# Patient Record
Sex: MALE | Race: WHITE | Employment: FULL TIME | URBAN - METROPOLITAN AREA
[De-identification: names, ages, dates, MRNs, and addresses within clinical notes are randomized per-mention and may not be internally consistent; named-entity substitution may affect disease eponyms.]

---

## 2020-02-12 ENCOUNTER — ED HISTORICAL/CONVERTED ENCOUNTER (OUTPATIENT)
Dept: OTHER | Age: 55
End: 2020-02-12

## 2020-11-24 ENCOUNTER — HOSPITAL ENCOUNTER (EMERGENCY)
Age: 55
Discharge: HOME | End: 2020-11-24
Attending: EMERGENCY MEDICINE
Payer: COMMERCIAL

## 2020-11-24 VITALS
BODY MASS INDEX: 25.77 KG/M2 | HEIGHT: 70 IN | WEIGHT: 180 LBS | RESPIRATION RATE: 16 BRPM | OXYGEN SATURATION: 98 % | TEMPERATURE: 98 F | HEART RATE: 75 BPM | DIASTOLIC BLOOD PRESSURE: 71 MMHG | SYSTOLIC BLOOD PRESSURE: 127 MMHG

## 2020-11-24 DIAGNOSIS — B35.4 TINEA CORPORIS: Primary | ICD-10-CM

## 2020-11-24 LAB
APPEARANCE UR: CLEAR
BACTERIA URNS QL MICRO: NEGATIVE /HPF
BILIRUB UR QL: NEGATIVE
COLOR UR: YELLOW
EPITH CASTS URNS QL MICRO: ABNORMAL /LPF
GLUCOSE BLD STRIP.AUTO-MCNC: 117 MG/DL (ref 65–100)
GLUCOSE UR STRIP.AUTO-MCNC: NEGATIVE MG/DL
HGB UR QL STRIP: NEGATIVE
KETONES UR QL STRIP.AUTO: NEGATIVE MG/DL
LEUKOCYTE ESTERASE UR QL STRIP.AUTO: NEGATIVE
NITRITE UR QL STRIP.AUTO: NEGATIVE
PERFORMED BY, TECHID: ABNORMAL
PH UR STRIP: 7 [PH] (ref 5–8)
PROT UR STRIP-MCNC: NEGATIVE MG/DL
RBC #/AREA URNS HPF: ABNORMAL /HPF (ref 0–5)
SP GR UR REFRACTOMETRY: 1.01 (ref 1–1.03)
UROBILINOGEN UR QL STRIP.AUTO: 0.1 EU/DL (ref 0.2–1)
WBC URNS QL MICRO: ABNORMAL /HPF (ref 0–4)

## 2020-11-24 PROCEDURE — 99283 EMERGENCY DEPT VISIT LOW MDM: CPT

## 2020-11-24 PROCEDURE — 81001 URINALYSIS AUTO W/SCOPE: CPT

## 2020-11-24 PROCEDURE — 82962 GLUCOSE BLOOD TEST: CPT

## 2020-11-24 RX ORDER — CLOTRIMAZOLE AND BETAMETHASONE DIPROPIONATE 10; .64 MG/G; MG/G
CREAM TOPICAL 2 TIMES DAILY
Qty: 1 TUBE | Refills: 0 | Status: SHIPPED | OUTPATIENT
Start: 2020-11-24 | End: 2022-04-28

## 2020-11-24 RX ORDER — FLUCONAZOLE 100 MG/1
150 TABLET ORAL DAILY
Qty: 7 TAB | Refills: 0 | Status: SHIPPED | OUTPATIENT
Start: 2020-11-24 | End: 2020-12-01

## 2020-11-25 NOTE — ED PROVIDER NOTES
EMERGENCY DEPARTMENT HISTORY AND PHYSICAL EXAM      Date: 11/24/2020  Patient Name: Phoenix Cocnepcion. History of Presenting Illness     Chief Complaint   Patient presents with    Rash       History Provided By: Patient    HPI: Phoenix Concepcion., 54 y.o. male with a past medical history significant No significant past medical history presents to the ED with cc of Groin rash 1 week. Used Lotrimin without relief    There are no other complaints, changes, or physical findings at this time. PCP: None    No current facility-administered medications on file prior to encounter. No current outpatient medications on file prior to encounter. Past History     Past Medical History:  No past medical history on file. Past Surgical History:  No past surgical history on file. Family History:  No family history on file. Social History:  Social History     Tobacco Use    Smoking status: Not on file   Substance Use Topics    Alcohol use: Not on file    Drug use: Not on file       Allergies:  No Known Allergies      Review of Systems     Review of Systems   Constitutional: Negative. HENT: Negative. Respiratory: Negative. Cardiovascular: Negative. Gastrointestinal: Negative. Genitourinary: Negative. Musculoskeletal: Negative. Skin: Positive for rash. Neurological: Negative. All other systems reviewed and are negative. Physical Exam     Physical Exam  Vitals signs and nursing note reviewed. Constitutional:       Appearance: Normal appearance. HENT:      Head: Normocephalic and atraumatic. Eyes:      Pupils: Pupils are equal, round, and reactive to light. Neck:      Musculoskeletal: Normal range of motion and neck supple. Cardiovascular:      Rate and Rhythm: Normal rate and regular rhythm. Pulses: Normal pulses. Heart sounds: Normal heart sounds. Pulmonary:      Effort: Pulmonary effort is normal.      Breath sounds: Normal breath sounds.    Musculoskeletal: Normal range of motion. Skin:     Comments: Erythematous tineaform rash in groin   Neurological:      General: No focal deficit present. Mental Status: He is alert and oriented to person, place, and time. Lab and Diagnostic Study Results     Labs -     Recent Results (from the past 12 hour(s))   URINALYSIS W/MICROSCOPIC    Collection Time: 11/24/20  6:15 PM   Result Value Ref Range    Color Yellow      Appearance Clear Clear      Specific gravity 1.010 1.003 - 1.030      pH (UA) 7.0 5.0 - 8.0      Protein Negative Negative mg/dL    Glucose Negative Negative mg/dL    Ketone Negative Negative mg/dL    Bilirubin Negative Negative      Blood Negative Negative      Urobilinogen 0.1 (L) 0.2 - 1.0 EU/dL    Nitrites Negative Negative      Leukocyte Esterase Negative Negative      WBC 0-4 0 - 4 /hpf    RBC 0-5 0 - 5 /hpf    Epithelial cells Few Few /lpf    Bacteria Negative Negative /hpf       Radiologic Studies -   @lastxrresult@  CT Results  (Last 48 hours)    None        CXR Results  (Last 48 hours)    None            Medical Decision Making   - I am the first provider for this patient. - I reviewed the vital signs, available nursing notes, past medical history, past surgical history, family history and social history. - Initial assessment performed. The patients presenting problems have been discussed, and they are in agreement with the care plan formulated and outlined with them. I have encouraged them to ask questions as they arise throughout their visit. Vital Signs-Reviewed the patient's vital signs. Patient Vitals for the past 12 hrs:   Temp Pulse Resp BP SpO2   11/24/20 1814 98 °F (36.7 °C) 75 16 127/71 98 %       Records Reviewed: Nursing Notes    The patient presents with       ED Course:          Provider Notes (Medical Decision Making):      MDM       Procedures   Medical Decision Makingedical Decision Making  Performed by: George Frank MD  PROCEDURES:  Procedures       Disposition Disposition: DC- Adult Discharges: All of the diagnostic tests were reviewed and questions answered. Diagnosis, care plan and treatment options were discussed. The patient understands the instructions and will follow up as directed. The patients results have been reviewed with them. They have been counseled regarding their diagnosis. The patient verbally convey understanding and agreement of the signs, symptoms, diagnosis, treatment and prognosis and additionally agrees to follow up as recommended with their PCP in 24 - 48 hours. They also agree with the care-plan and convey that all of their questions have been answered. I have also put together some discharge instructions for them that include: 1) educational information regarding their diagnosis, 2) how to care for their diagnosis at home, as well a 3) list of reasons why they would want to return to the ED prior to their follow-up appointment, should their condition change. Discharged    DISCHARGE PLAN:  1. There are no discharge medications for this patient. 2.   Follow-up Information     Follow up With Specialties Details Why Contact Info    Anya Schofield MD Internal Medicine In 1 week  [address] [facility]  [phone]          3. Return to ED if worse   4. Current Discharge Medication List      START taking these medications    Details   clotrimazole-betamethasone (Lotrisone) topical cream Apply  to affected area two (2) times a day. Apply to affected area  Qty: 1 Tube, Refills: 0      fluconazole (Diflucan) 100 mg tablet Take 1.5 Tabs by mouth daily for 7 days. FDA advises cautious prescribing of oral fluconazole in pregnancy. Qty: 7 Tab, Refills: 0               Diagnosis     Clinical Impression:   1. Tinea corporis        Attestations:    Divina Macdonald MD    Please note that this dictation was completed with [email or web address], the i3 membrane voice recognition software.   Quite often unanticipated grammatical, syntax, homophones, and other interpretive errors are inadvertently transcribed by the computer software. Please disregard these errors. Please excuse any errors that have escaped final proofreading. Thank you.

## 2022-04-28 ENCOUNTER — ANESTHESIA (OUTPATIENT)
Dept: SURGERY | Age: 57
DRG: 654 | End: 2022-04-28
Payer: COMMERCIAL

## 2022-04-28 ENCOUNTER — APPOINTMENT (OUTPATIENT)
Dept: GENERAL RADIOLOGY | Age: 57
DRG: 654 | End: 2022-04-28
Attending: UROLOGY
Payer: COMMERCIAL

## 2022-04-28 ENCOUNTER — ANESTHESIA EVENT (OUTPATIENT)
Dept: SURGERY | Age: 57
DRG: 654 | End: 2022-04-28
Payer: COMMERCIAL

## 2022-04-28 ENCOUNTER — APPOINTMENT (OUTPATIENT)
Dept: CT IMAGING | Age: 57
DRG: 654 | End: 2022-04-28
Attending: EMERGENCY MEDICINE
Payer: COMMERCIAL

## 2022-04-28 ENCOUNTER — HOSPITAL ENCOUNTER (INPATIENT)
Age: 57
LOS: 1 days | Discharge: HOME | DRG: 654 | End: 2022-04-29
Attending: EMERGENCY MEDICINE | Admitting: FAMILY MEDICINE
Payer: COMMERCIAL

## 2022-04-28 DIAGNOSIS — N20.0 KIDNEY STONE: Primary | ICD-10-CM

## 2022-04-28 LAB
ALBUMIN SERPL-MCNC: 3.6 G/DL (ref 3.5–5)
ALBUMIN/GLOB SERPL: 1.3 {RATIO} (ref 1.1–2.2)
ALP SERPL-CCNC: 92 U/L (ref 45–117)
ALT SERPL-CCNC: 24 U/L (ref 12–78)
ANION GAP SERPL CALC-SCNC: 5 MMOL/L (ref 5–15)
APPEARANCE UR: ABNORMAL
AST SERPL W P-5'-P-CCNC: 15 U/L (ref 15–37)
BACTERIA URNS QL MICRO: NEGATIVE /HPF
BASOPHILS # BLD: 0 K/UL (ref 0–0.1)
BASOPHILS NFR BLD: 0 % (ref 0–1)
BILIRUB SERPL-MCNC: 0.6 MG/DL (ref 0.2–1)
BILIRUB UR QL: NEGATIVE
BUN SERPL-MCNC: 13 MG/DL (ref 6–20)
BUN/CREAT SERPL: 11 (ref 12–20)
CA-I BLD-MCNC: 8.8 MG/DL (ref 8.5–10.1)
CHLORIDE SERPL-SCNC: 109 MMOL/L (ref 97–108)
CO2 SERPL-SCNC: 27 MMOL/L (ref 21–32)
COLOR UR: ABNORMAL
CREAT SERPL-MCNC: 1.18 MG/DL (ref 0.7–1.3)
DIFFERENTIAL METHOD BLD: NORMAL
EOSINOPHIL # BLD: 0.1 K/UL (ref 0–0.4)
EOSINOPHIL NFR BLD: 2 % (ref 0–7)
ERYTHROCYTE [DISTWIDTH] IN BLOOD BY AUTOMATED COUNT: 12.4 % (ref 11.5–14.5)
GLOBULIN SER CALC-MCNC: 2.7 G/DL (ref 2–4)
GLUCOSE SERPL-MCNC: 118 MG/DL (ref 65–100)
GLUCOSE UR STRIP.AUTO-MCNC: NEGATIVE MG/DL
HCT VFR BLD AUTO: 45.8 % (ref 36.6–50.3)
HGB BLD-MCNC: 15.3 G/DL (ref 12.1–17)
HGB UR QL STRIP: ABNORMAL
HYALINE CASTS URNS QL MICRO: ABNORMAL /LPF (ref 0–5)
IMM GRANULOCYTES # BLD AUTO: 0 K/UL (ref 0–0.04)
IMM GRANULOCYTES NFR BLD AUTO: 0 % (ref 0–0.5)
KETONES UR QL STRIP.AUTO: 5 MG/DL
LEUKOCYTE ESTERASE UR QL STRIP.AUTO: ABNORMAL
LYMPHOCYTES # BLD: 1.8 K/UL (ref 0.8–3.5)
LYMPHOCYTES NFR BLD: 25 % (ref 12–49)
MCH RBC QN AUTO: 31.7 PG (ref 26–34)
MCHC RBC AUTO-ENTMCNC: 33.4 G/DL (ref 30–36.5)
MCV RBC AUTO: 94.8 FL (ref 80–99)
MONOCYTES # BLD: 0.8 K/UL (ref 0–1)
MONOCYTES NFR BLD: 11 % (ref 5–13)
MUCOUS THREADS URNS QL MICRO: ABNORMAL /LPF
NEUTS SEG # BLD: 4.6 K/UL (ref 1.8–8)
NEUTS SEG NFR BLD: 62 % (ref 32–75)
NITRITE UR QL STRIP.AUTO: NEGATIVE
NRBC # BLD: 0 K/UL (ref 0–0.01)
NRBC BLD-RTO: 0 PER 100 WBC
PH UR STRIP: 5 [PH] (ref 5–8)
PLATELET # BLD AUTO: 205 K/UL (ref 150–400)
PMV BLD AUTO: 11.1 FL (ref 8.9–12.9)
POTASSIUM SERPL-SCNC: 3.9 MMOL/L (ref 3.5–5.1)
PROT SERPL-MCNC: 6.3 G/DL (ref 6.4–8.2)
PROT UR STRIP-MCNC: NEGATIVE MG/DL
RBC # BLD AUTO: 4.83 M/UL (ref 4.1–5.7)
RBC #/AREA URNS HPF: >100 /HPF (ref 0–5)
SODIUM SERPL-SCNC: 141 MMOL/L (ref 136–145)
SP GR UR REFRACTOMETRY: 1.02 (ref 1–1.03)
UA: UC IF INDICATED,UAUC: ABNORMAL
UROBILINOGEN UR QL STRIP.AUTO: 0.1 EU/DL (ref 0.1–1)
WBC # BLD AUTO: 7.4 K/UL (ref 4.1–11.1)
WBC URNS QL MICRO: ABNORMAL /HPF (ref 0–4)

## 2022-04-28 PROCEDURE — 77030020268 HC MISC GENERAL SUPPLY: Performed by: UROLOGY

## 2022-04-28 PROCEDURE — 65270000029 HC RM PRIVATE

## 2022-04-28 PROCEDURE — 96361 HYDRATE IV INFUSION ADD-ON: CPT

## 2022-04-28 PROCEDURE — 85025 COMPLETE CBC W/AUTO DIFF WBC: CPT

## 2022-04-28 PROCEDURE — 87086 URINE CULTURE/COLONY COUNT: CPT

## 2022-04-28 PROCEDURE — 96374 THER/PROPH/DIAG INJ IV PUSH: CPT

## 2022-04-28 PROCEDURE — 96375 TX/PRO/DX INJ NEW DRUG ADDON: CPT

## 2022-04-28 PROCEDURE — 77030013079 HC BLNKT BAIR HGGR 3M -A: Performed by: ANESTHESIOLOGY

## 2022-04-28 PROCEDURE — 0TC78ZZ EXTIRPATION OF MATTER FROM LEFT URETER, VIA NATURAL OR ARTIFICIAL OPENING ENDOSCOPIC: ICD-10-PCS | Performed by: UROLOGY

## 2022-04-28 PROCEDURE — 74011250637 HC RX REV CODE- 250/637: Performed by: EMERGENCY MEDICINE

## 2022-04-28 PROCEDURE — C2617 STENT, NON-COR, TEM W/O DEL: HCPCS | Performed by: UROLOGY

## 2022-04-28 PROCEDURE — 81001 URINALYSIS AUTO W/SCOPE: CPT

## 2022-04-28 PROCEDURE — 74011250636 HC RX REV CODE- 250/636: Performed by: NURSE ANESTHETIST, CERTIFIED REGISTERED

## 2022-04-28 PROCEDURE — 76210000006 HC OR PH I REC 0.5 TO 1 HR: Performed by: UROLOGY

## 2022-04-28 PROCEDURE — 82360 CALCULUS ASSAY QUANT: CPT

## 2022-04-28 PROCEDURE — 77030010509 HC AIRWY LMA MSK TELE -A: Performed by: ANESTHESIOLOGY

## 2022-04-28 PROCEDURE — 74011250636 HC RX REV CODE- 250/636

## 2022-04-28 PROCEDURE — 76060000033 HC ANESTHESIA 1 TO 1.5 HR: Performed by: UROLOGY

## 2022-04-28 PROCEDURE — 0T7B8DZ DILATION OF BLADDER WITH INTRALUMINAL DEVICE, VIA NATURAL OR ARTIFICIAL OPENING ENDOSCOPIC: ICD-10-PCS | Performed by: UROLOGY

## 2022-04-28 PROCEDURE — 74011000250 HC RX REV CODE- 250: Performed by: NURSE ANESTHETIST, CERTIFIED REGISTERED

## 2022-04-28 PROCEDURE — 96376 TX/PRO/DX INJ SAME DRUG ADON: CPT

## 2022-04-28 PROCEDURE — C1758 CATHETER, URETERAL: HCPCS | Performed by: UROLOGY

## 2022-04-28 PROCEDURE — 74011250637 HC RX REV CODE- 250/637: Performed by: FAMILY MEDICINE

## 2022-04-28 PROCEDURE — 99221 1ST HOSP IP/OBS SF/LOW 40: CPT | Performed by: UROLOGY

## 2022-04-28 PROCEDURE — 74011250636 HC RX REV CODE- 250/636: Performed by: FAMILY MEDICINE

## 2022-04-28 PROCEDURE — 77030030430 HC EXTRCT STN COOK -C: Performed by: UROLOGY

## 2022-04-28 PROCEDURE — 74011250636 HC RX REV CODE- 250/636: Performed by: EMERGENCY MEDICINE

## 2022-04-28 PROCEDURE — 76010000161 HC OR TIME 1 TO 1.5 HR INTENSV-TIER 1: Performed by: UROLOGY

## 2022-04-28 PROCEDURE — 74011000250 HC RX REV CODE- 250: Performed by: FAMILY MEDICINE

## 2022-04-28 PROCEDURE — 80053 COMPREHEN METABOLIC PANEL: CPT

## 2022-04-28 PROCEDURE — 76000 FLUOROSCOPY <1 HR PHYS/QHP: CPT

## 2022-04-28 PROCEDURE — 74176 CT ABD & PELVIS W/O CONTRAST: CPT

## 2022-04-28 PROCEDURE — 2709999900 HC NON-CHARGEABLE SUPPLY: Performed by: UROLOGY

## 2022-04-28 PROCEDURE — 77030040361 HC SLV COMPR DVT MDII -B: Performed by: UROLOGY

## 2022-04-28 PROCEDURE — 99285 EMERGENCY DEPT VISIT HI MDM: CPT

## 2022-04-28 PROCEDURE — 52356 CYSTO/URETERO W/LITHOTRIPSY: CPT | Performed by: UROLOGY

## 2022-04-28 DEVICE — VARIABLE LENGTH URETERAL STENT
Type: IMPLANTABLE DEVICE | Status: FUNCTIONAL
Brand: CONTOUR VL™

## 2022-04-28 RX ORDER — HYDROMORPHONE HYDROCHLORIDE 1 MG/ML
0.5 INJECTION, SOLUTION INTRAMUSCULAR; INTRAVENOUS; SUBCUTANEOUS
Status: DISCONTINUED | OUTPATIENT
Start: 2022-04-28 | End: 2022-04-28 | Stop reason: HOSPADM

## 2022-04-28 RX ORDER — PROPOFOL 10 MG/ML
INJECTION, EMULSION INTRAVENOUS AS NEEDED
Status: DISCONTINUED | OUTPATIENT
Start: 2022-04-28 | End: 2022-04-28 | Stop reason: HOSPADM

## 2022-04-28 RX ORDER — TAMSULOSIN HYDROCHLORIDE 0.4 MG/1
0.4 CAPSULE ORAL DAILY
Status: DISCONTINUED | OUTPATIENT
Start: 2022-04-28 | End: 2022-04-28

## 2022-04-28 RX ORDER — TRAMADOL HYDROCHLORIDE 50 MG/1
50 TABLET ORAL
Status: DISCONTINUED | OUTPATIENT
Start: 2022-04-28 | End: 2022-04-29 | Stop reason: HOSPADM

## 2022-04-28 RX ORDER — SODIUM CHLORIDE 0.9 % (FLUSH) 0.9 %
5-40 SYRINGE (ML) INJECTION AS NEEDED
Status: DISCONTINUED | OUTPATIENT
Start: 2022-04-28 | End: 2022-04-28 | Stop reason: HOSPADM

## 2022-04-28 RX ORDER — ACETAMINOPHEN 325 MG/1
650 TABLET ORAL
Status: DISCONTINUED | OUTPATIENT
Start: 2022-04-28 | End: 2022-04-29 | Stop reason: HOSPADM

## 2022-04-28 RX ORDER — FENTANYL CITRATE 50 UG/ML
50 INJECTION, SOLUTION INTRAMUSCULAR; INTRAVENOUS AS NEEDED
Status: DISCONTINUED | OUTPATIENT
Start: 2022-04-28 | End: 2022-04-28 | Stop reason: HOSPADM

## 2022-04-28 RX ORDER — HYDROMORPHONE HYDROCHLORIDE 2 MG/ML
INJECTION, SOLUTION INTRAMUSCULAR; INTRAVENOUS; SUBCUTANEOUS
Status: COMPLETED
Start: 2022-04-28 | End: 2022-04-28

## 2022-04-28 RX ORDER — HYDROMORPHONE HYDROCHLORIDE 1 MG/ML
1 INJECTION, SOLUTION INTRAMUSCULAR; INTRAVENOUS; SUBCUTANEOUS ONCE
Status: COMPLETED | OUTPATIENT
Start: 2022-04-28 | End: 2022-04-28

## 2022-04-28 RX ORDER — SODIUM CHLORIDE 9 MG/ML
125 INJECTION, SOLUTION INTRAVENOUS CONTINUOUS
Status: DISCONTINUED | OUTPATIENT
Start: 2022-04-28 | End: 2022-04-29 | Stop reason: HOSPADM

## 2022-04-28 RX ORDER — SODIUM CHLORIDE 0.9 % (FLUSH) 0.9 %
5-40 SYRINGE (ML) INJECTION EVERY 8 HOURS
Status: DISCONTINUED | OUTPATIENT
Start: 2022-04-28 | End: 2022-04-28 | Stop reason: HOSPADM

## 2022-04-28 RX ORDER — DIPHENHYDRAMINE HYDROCHLORIDE 50 MG/ML
12.5 INJECTION, SOLUTION INTRAMUSCULAR; INTRAVENOUS AS NEEDED
Status: DISCONTINUED | OUTPATIENT
Start: 2022-04-28 | End: 2022-04-28 | Stop reason: HOSPADM

## 2022-04-28 RX ORDER — POLYETHYLENE GLYCOL 3350 17 G/17G
17 POWDER, FOR SOLUTION ORAL DAILY PRN
Status: DISCONTINUED | OUTPATIENT
Start: 2022-04-28 | End: 2022-04-29 | Stop reason: HOSPADM

## 2022-04-28 RX ORDER — ONDANSETRON 4 MG/1
4 TABLET, ORALLY DISINTEGRATING ORAL
Status: DISCONTINUED | OUTPATIENT
Start: 2022-04-28 | End: 2022-04-29 | Stop reason: HOSPADM

## 2022-04-28 RX ORDER — DEXAMETHASONE SODIUM PHOSPHATE 4 MG/ML
INJECTION, SOLUTION INTRA-ARTICULAR; INTRALESIONAL; INTRAMUSCULAR; INTRAVENOUS; SOFT TISSUE AS NEEDED
Status: DISCONTINUED | OUTPATIENT
Start: 2022-04-28 | End: 2022-04-28 | Stop reason: HOSPADM

## 2022-04-28 RX ORDER — PHENAZOPYRIDINE HYDROCHLORIDE 95 MG/1
95 TABLET ORAL
Status: DISCONTINUED | OUTPATIENT
Start: 2022-04-28 | End: 2022-04-29 | Stop reason: HOSPADM

## 2022-04-28 RX ORDER — MIDAZOLAM HYDROCHLORIDE 1 MG/ML
1 INJECTION, SOLUTION INTRAMUSCULAR; INTRAVENOUS AS NEEDED
Status: DISCONTINUED | OUTPATIENT
Start: 2022-04-28 | End: 2022-04-28 | Stop reason: HOSPADM

## 2022-04-28 RX ORDER — HYDROMORPHONE HYDROCHLORIDE 1 MG/ML
2 INJECTION, SOLUTION INTRAMUSCULAR; INTRAVENOUS; SUBCUTANEOUS ONCE
Status: ACTIVE | OUTPATIENT
Start: 2022-04-28 | End: 2022-04-29

## 2022-04-28 RX ORDER — ONDANSETRON 2 MG/ML
4 INJECTION INTRAMUSCULAR; INTRAVENOUS
Status: DISCONTINUED | OUTPATIENT
Start: 2022-04-28 | End: 2022-04-29 | Stop reason: HOSPADM

## 2022-04-28 RX ORDER — LIDOCAINE HYDROCHLORIDE 10 MG/ML
0.1 INJECTION, SOLUTION EPIDURAL; INFILTRATION; INTRACAUDAL; PERINEURAL AS NEEDED
Status: DISCONTINUED | OUTPATIENT
Start: 2022-04-28 | End: 2022-04-28 | Stop reason: HOSPADM

## 2022-04-28 RX ORDER — ATROPA BELLADONNA AND OPIUM 16.2; 6 MG/1; MG/1
SUPPOSITORY RECTAL
Status: DISPENSED
Start: 2022-04-28 | End: 2022-04-29

## 2022-04-28 RX ORDER — ALBUTEROL SULFATE 0.83 MG/ML
2.5 SOLUTION RESPIRATORY (INHALATION) AS NEEDED
Status: DISCONTINUED | OUTPATIENT
Start: 2022-04-28 | End: 2022-04-28 | Stop reason: HOSPADM

## 2022-04-28 RX ORDER — FENTANYL CITRATE 50 UG/ML
INJECTION, SOLUTION INTRAMUSCULAR; INTRAVENOUS AS NEEDED
Status: DISCONTINUED | OUTPATIENT
Start: 2022-04-28 | End: 2022-04-28 | Stop reason: HOSPADM

## 2022-04-28 RX ORDER — ACETAMINOPHEN 650 MG/1
650 SUPPOSITORY RECTAL
Status: DISCONTINUED | OUTPATIENT
Start: 2022-04-28 | End: 2022-04-29 | Stop reason: HOSPADM

## 2022-04-28 RX ORDER — ONDANSETRON 2 MG/ML
4 INJECTION INTRAMUSCULAR; INTRAVENOUS ONCE
Status: COMPLETED | OUTPATIENT
Start: 2022-04-28 | End: 2022-04-28

## 2022-04-28 RX ORDER — MIDAZOLAM HYDROCHLORIDE 1 MG/ML
0.5 INJECTION, SOLUTION INTRAMUSCULAR; INTRAVENOUS
Status: DISCONTINUED | OUTPATIENT
Start: 2022-04-28 | End: 2022-04-28 | Stop reason: HOSPADM

## 2022-04-28 RX ORDER — ENOXAPARIN SODIUM 100 MG/ML
40 INJECTION SUBCUTANEOUS DAILY
Status: DISCONTINUED | OUTPATIENT
Start: 2022-04-28 | End: 2022-04-29 | Stop reason: HOSPADM

## 2022-04-28 RX ORDER — ONDANSETRON 2 MG/ML
INJECTION INTRAMUSCULAR; INTRAVENOUS AS NEEDED
Status: DISCONTINUED | OUTPATIENT
Start: 2022-04-28 | End: 2022-04-28 | Stop reason: HOSPADM

## 2022-04-28 RX ORDER — TAMSULOSIN HYDROCHLORIDE 0.4 MG/1
0.4 CAPSULE ORAL
Status: COMPLETED | OUTPATIENT
Start: 2022-04-28 | End: 2022-04-28

## 2022-04-28 RX ORDER — KETOROLAC TROMETHAMINE 30 MG/ML
30 INJECTION, SOLUTION INTRAMUSCULAR; INTRAVENOUS ONCE
Status: COMPLETED | OUTPATIENT
Start: 2022-04-28 | End: 2022-04-28

## 2022-04-28 RX ORDER — MIDAZOLAM HYDROCHLORIDE 1 MG/ML
INJECTION, SOLUTION INTRAMUSCULAR; INTRAVENOUS AS NEEDED
Status: DISCONTINUED | OUTPATIENT
Start: 2022-04-28 | End: 2022-04-28 | Stop reason: HOSPADM

## 2022-04-28 RX ORDER — LIDOCAINE HYDROCHLORIDE 20 MG/ML
INJECTION, SOLUTION EPIDURAL; INFILTRATION; INTRACAUDAL; PERINEURAL AS NEEDED
Status: DISCONTINUED | OUTPATIENT
Start: 2022-04-28 | End: 2022-04-28 | Stop reason: HOSPADM

## 2022-04-28 RX ORDER — HYDROCODONE BITARTRATE AND ACETAMINOPHEN 5; 325 MG/1; MG/1
1 TABLET ORAL AS NEEDED
Status: DISCONTINUED | OUTPATIENT
Start: 2022-04-28 | End: 2022-04-28 | Stop reason: HOSPADM

## 2022-04-28 RX ORDER — EPHEDRINE SULFATE/0.9% NACL/PF 50 MG/5 ML
5 SYRINGE (ML) INTRAVENOUS AS NEEDED
Status: DISCONTINUED | OUTPATIENT
Start: 2022-04-28 | End: 2022-04-28 | Stop reason: HOSPADM

## 2022-04-28 RX ORDER — FENTANYL CITRATE 50 UG/ML
50 INJECTION, SOLUTION INTRAMUSCULAR; INTRAVENOUS
Status: DISCONTINUED | OUTPATIENT
Start: 2022-04-28 | End: 2022-04-28 | Stop reason: HOSPADM

## 2022-04-28 RX ORDER — ONDANSETRON 2 MG/ML
4 INJECTION INTRAMUSCULAR; INTRAVENOUS AS NEEDED
Status: DISCONTINUED | OUTPATIENT
Start: 2022-04-28 | End: 2022-04-28 | Stop reason: HOSPADM

## 2022-04-28 RX ADMIN — FENTANYL CITRATE 50 MCG: 50 INJECTION, SOLUTION INTRAMUSCULAR; INTRAVENOUS at 22:04

## 2022-04-28 RX ADMIN — MIDAZOLAM HYDROCHLORIDE 2 MG: 2 INJECTION, SOLUTION INTRAMUSCULAR; INTRAVENOUS at 21:25

## 2022-04-28 RX ADMIN — HYDROMORPHONE HYDROCHLORIDE 1 MG: 1 INJECTION, SOLUTION INTRAMUSCULAR; INTRAVENOUS; SUBCUTANEOUS at 01:28

## 2022-04-28 RX ADMIN — FENTANYL CITRATE 50 MCG: 50 INJECTION, SOLUTION INTRAMUSCULAR; INTRAVENOUS at 21:56

## 2022-04-28 RX ADMIN — PROPOFOL 200 MG: 10 INJECTION, EMULSION INTRAVENOUS at 21:29

## 2022-04-28 RX ADMIN — SODIUM CHLORIDE 1000 ML: 9 INJECTION, SOLUTION INTRAVENOUS at 03:36

## 2022-04-28 RX ADMIN — SODIUM CHLORIDE 1000 ML: 9 INJECTION, SOLUTION INTRAVENOUS at 01:25

## 2022-04-28 RX ADMIN — ONDANSETRON 4 MG: 2 INJECTION INTRAMUSCULAR; INTRAVENOUS at 01:26

## 2022-04-28 RX ADMIN — TAMSULOSIN HYDROCHLORIDE 0.4 MG: 0.4 CAPSULE ORAL at 03:39

## 2022-04-28 RX ADMIN — SODIUM CHLORIDE: 9 INJECTION, SOLUTION INTRAVENOUS at 21:35

## 2022-04-28 RX ADMIN — HYDROMORPHONE HYDROCHLORIDE 2 MG: 2 INJECTION INTRAMUSCULAR; INTRAVENOUS; SUBCUTANEOUS at 19:53

## 2022-04-28 RX ADMIN — TRAMADOL HYDROCHLORIDE 50 MG: 50 TABLET, COATED ORAL at 19:19

## 2022-04-28 RX ADMIN — HYDROMORPHONE HYDROCHLORIDE 1 MG: 1 INJECTION, SOLUTION INTRAMUSCULAR; INTRAVENOUS; SUBCUTANEOUS at 04:17

## 2022-04-28 RX ADMIN — ONDANSETRON 4 MG: 2 INJECTION INTRAMUSCULAR; INTRAVENOUS at 19:19

## 2022-04-28 RX ADMIN — TRAMADOL HYDROCHLORIDE 50 MG: 50 TABLET, COATED ORAL at 12:14

## 2022-04-28 RX ADMIN — ONDANSETRON 4 MG: 2 INJECTION INTRAMUSCULAR; INTRAVENOUS at 09:49

## 2022-04-28 RX ADMIN — ENOXAPARIN SODIUM 40 MG: 40 INJECTION SUBCUTANEOUS at 09:51

## 2022-04-28 RX ADMIN — DEXAMETHASONE SODIUM PHOSPHATE 4 MG: 4 INJECTION, SOLUTION INTRA-ARTICULAR; INTRALESIONAL; INTRAMUSCULAR; INTRAVENOUS; SOFT TISSUE at 21:25

## 2022-04-28 RX ADMIN — ONDANSETRON 4 MG: 2 INJECTION INTRAMUSCULAR; INTRAVENOUS at 21:25

## 2022-04-28 RX ADMIN — HYDROMORPHONE HYDROCHLORIDE 1 MG: 1 INJECTION, SOLUTION INTRAMUSCULAR; INTRAVENOUS; SUBCUTANEOUS at 06:34

## 2022-04-28 RX ADMIN — SODIUM CHLORIDE 125 ML/HR: 9 INJECTION, SOLUTION INTRAVENOUS at 09:49

## 2022-04-28 RX ADMIN — KETOROLAC TROMETHAMINE 30 MG: 30 INJECTION, SOLUTION INTRAMUSCULAR at 01:27

## 2022-04-28 RX ADMIN — SODIUM CHLORIDE 125 ML/HR: 9 INJECTION, SOLUTION INTRAVENOUS at 18:33

## 2022-04-28 RX ADMIN — LIDOCAINE HYDROCHLORIDE 100 MG: 20 INJECTION, SOLUTION EPIDURAL; INFILTRATION; INTRACAUDAL; PERINEURAL at 21:29

## 2022-04-28 RX ADMIN — CEFTRIAXONE SODIUM 1 G: 1 INJECTION, POWDER, FOR SOLUTION INTRAMUSCULAR; INTRAVENOUS at 09:50

## 2022-04-28 NOTE — H&P
HISTORY & PHYSICAL      Patient: [address] [place] [address],[address] MRN: [id number]  SSN: [id number]    YOB: 1965  Age: 64 y.o. Sex: male      Primary Care Provider: None  Source of Information: patient      Subjective     CC:   Chief Complaint   Patient presents with    Flank Pain       HPI: [facility],[address]. is a(n) 64 y.o. male with PMH significant for kidney stones who present to ED with left flank pain that radiates to left groin. Patient reports having one kidney stone in past few years ago. Pain is similar to stone in past. C/o some nausea. Denies vomiting or trauma. CT abd pelv wo contrast - 6.5 mm obstructing stone left UPJ. Left perinephric inflammation. Correlate  for medical renal disease to include pyelonephritis. Unremarkable labs    UA positive    Today patient awake and alert. Wife at bedside  Urology saw patient and discussed possible surgery this evening since patient had lunch today. Past Medical History:   Diagnosis Date    Kidney calculi         No past surgical history on file. Prior to Admission medications    Not on File       No Known Allergies     No family history on file. Social History     Socioeconomic History    Marital status:    Tobacco Use    Smoking status: Never Smoker    Smokeless tobacco: Never Used   Substance and Sexual Activity    Alcohol use: Not Currently    Drug use: Never        Review of Systems   Constitutional: Negative for chills and fever. Respiratory: Negative. Cardiovascular: Negative. Gastrointestinal: Positive for nausea. Genitourinary: Positive for flank pain. Neurological: Negative.           CODE STATUS:  DNR    Full    Other        Objective     Visit Vitals  /67 (BP 1 Location: Left upper arm, BP Patient Position: At rest)   Pulse 60   Temp 97.6 °F (36.4 °C)   Resp 18   Ht 5' 11\" (1.803 m)   Wt 93 kg (205 lb)   SpO2 97%   BMI 28.59 kg/m²      O2 Device: None (Room air)    Physical Exam:   Physical Exam  HENT: Head: Normocephalic and atraumatic. Cardiovascular:      Rate and Rhythm: Normal rate and regular rhythm. Pulmonary:      Effort: Pulmonary effort is normal.      Breath sounds: Normal breath sounds. Abdominal:      Palpations: Abdomen is soft. Tenderness: There is left CVA tenderness. Skin:     General: Skin is warm and dry. Neurological:      General: No focal deficit present. Mental Status: He is alert and oriented to person, place, and time. Intake & Output:  Current Shift: No intake/output data recorded. Last three shifts: 04/26 1901 - 04/28 0700  In: 2000 [I.V.:2000]  Out: -     Lab/Data Review: All lab results for the last 24 hours reviewed. 24 Hour Results:    Recent Results (from the past 24 hour(s))   CBC WITH AUTOMATED DIFF    Collection Time: 04/28/22  1:00 AM   Result Value Ref Range    WBC 7.4 4.1 - 11.1 K/uL    RBC 4.83 4.10 - 5.70 M/uL    HGB 15.3 12.1 - 17.0 g/dL    HCT 45.8 36.6 - 50.3 %    MCV 94.8 80.0 - 99.0 FL    MCH 31.7 26.0 - 34.0 PG    MCHC 33.4 30.0 - 36.5 g/dL    RDW 12.4 11.5 - 14.5 %    PLATELET 823 813 - 094 K/uL    MPV 11.1 8.9 - 12.9 FL    NRBC 0.0 0.0  WBC    ABSOLUTE NRBC 0.00 0.00 - 0.01 K/uL    NEUTROPHILS 62 32 - 75 %    LYMPHOCYTES 25 12 - 49 %    MONOCYTES 11 5 - 13 %    EOSINOPHILS 2 0 - 7 %    BASOPHILS 0 0 - 1 %    IMMATURE GRANULOCYTES 0 0 - 0.5 %    ABS. NEUTROPHILS 4.6 1.8 - 8.0 K/UL    ABS. LYMPHOCYTES 1.8 0.8 - 3.5 K/UL    ABS. MONOCYTES 0.8 0.0 - 1.0 K/UL    ABS. EOSINOPHILS 0.1 0.0 - 0.4 K/UL    ABS. BASOPHILS 0.0 0.0 - 0.1 K/UL    ABS. IMM.  GRANS. 0.0 0.00 - 0.04 K/UL    DF AUTOMATED     METABOLIC PANEL, COMPREHENSIVE    Collection Time: 04/28/22  1:00 AM   Result Value Ref Range    Sodium 141 136 - 145 mmol/L    Potassium 3.9 3.5 - 5.1 mmol/L    Chloride 109 (H) 97 - 108 mmol/L    CO2 27 21 - 32 mmol/L    Anion gap 5 5 - 15 mmol/L    Glucose 118 (H) 65 - 100 mg/dL    BUN 13 6 - 20 mg/dL    Creatinine 1.18 0.70 - 1.30 mg/dL    BUN/Creatinine ratio 11 (L) 12 - 20      GFR est AA >60 >60 ml/min/1.73m2    GFR est non-AA >60 >60 ml/min/1.73m2    Calcium 8.8 8.5 - 10.1 mg/dL    Bilirubin, total 0.6 0.2 - 1.0 mg/dL    AST (SGOT) 15 15 - 37 U/L    ALT (SGPT) 24 12 - 78 U/L    Alk. phosphatase 92 45 - 117 U/L    Protein, total 6.3 (L) 6.4 - 8.2 g/dL    Albumin 3.6 3.5 - 5.0 g/dL    Globulin 2.7 2.0 - 4.0 g/dL    A-G Ratio 1.3 1.1 - 2.2     URINALYSIS W/ REFLEX CULTURE    Collection Time: 04/28/22  6:07 AM    Specimen: Urine   Result Value Ref Range    Color Yellow/Straw      Appearance Turbid (A) Clear      Specific gravity 1.018 1.003 - 1.030      pH (UA) 5.0 5.0 - 8.0      Protein Negative Negative mg/dL    Glucose Negative Negative mg/dL    Ketone 5 (A) Negative mg/dL    Bilirubin Negative Negative      Blood Moderate (A) Negative      Urobilinogen 0.1 0.1 - 1.0 EU/dL    Nitrites Negative Negative      Leukocyte Esterase Large (A) Negative      WBC 20-50 0 - 4 /hpf    RBC >100 (H) 0 - 5 /hpf    Bacteria Negative Negative /hpf    UA:UC IF INDICATED Urine Culture Ordered (A) Culture not indicated by UA result      Mucus 3+ (A) Negative /lpf    Hyaline cast 5-10 0 - 5 /lpf         Imaging:  [unfilled]    CT ABD PELV WO CONT   Final Result   1. 6.5 mm obstructing stone left UPJ. Left perinephric inflammation. Correlate   for medical renal disease to include pyelonephritis. 2. Nonobstructing bilateral nephrolithiasis.                      Assessment     Left sided kidney stone 6.6   Pyelonephritis  History of kidney stone    Plan     Rocephin 1 g every 24 hours for 5 doses  Lovenox 40 mg subcu daily    IV fluids    NPO  Consult urology  Possible surgery this evening      Current Facility-Administered Medications:     0.9% sodium chloride infusion, 125 mL/hr, IntraVENous, CONTINUOUS, Jamey Fernandes MD, Last Rate: 125 mL/hr at 04/28/22 0949, 125 mL/hr at 04/28/22 0949    acetaminophen (TYLENOL) tablet 650 mg, 650 mg, Oral, Q6H PRN **OR** acetaminophen (TYLENOL) suppository 650 mg, 650 mg, Rectal, Q6H PRN, Catracho Fernandes MD    polyethylene glycol (MIRALAX) packet 17 g, 17 g, Oral, DAILY PRN, Catracho Fernandes MD    ondansetron (ZOFRAN ODT) tablet 4 mg, 4 mg, Oral, Q8H PRN **OR** ondansetron (ZOFRAN) injection 4 mg, 4 mg, IntraVENous, Q6H PRN, Jamey Fernandes MD, 4 mg at 04/28/22 0949    enoxaparin (LOVENOX) injection 40 mg, 40 mg, SubCUTAneous, DAILY, Jamey Fernandes MD, 40 mg at 04/28/22 0307    cefTRIAXone (ROCEPHIN) 1 g in sterile water (preservative free) 10 mL IV syringe, 1 g, IntraVENous, Q24H, Jamey Fernandes MD, 1 g at 04/28/22 0950    traMADoL (ULTRAM) tablet 50 mg, 50 mg, Oral, Q6H PRN, Jamey Fernandes MD, 50 mg at 04/28/22 1214        Signed By: Yari Peres MD     April 28, 2022

## 2022-04-28 NOTE — PROGRESS NOTES
Called Dr. Yamilka Perez office and talk to [facility] and gave information about consult @ 11:25 am.

## 2022-04-28 NOTE — CONSULTS
UROLOGY CONSULT NOTE  [phone]        Patient: [address] [place] [address],[address] MRN: [id number]  SSN: [id number]    YOB: 1965  Age: 64 y.o. Sex: male      REFERRING PROVIDER: jose l  Subjective: [address] [place] [address],[address]. is a 64 y.o. male who is being seen in urologic consultation for ureteral stone, 6 mm     HPI: [address] [place] [address],[address]. is a(n) 64 y.o. male with PMH significant for kidney stones who present to ED with left flank pain that radiates to left groin. Patient reports having one kidney stone in past few years ago. Pain is similar to stone in past. C/o some nausea. Denies vomiting or trauma.      CT abd pelv wo contrast - 6.5 mm obstructing stone left UPJ. Left perinephric inflammation. Correlate  for medical renal disease to include pyelonephritis.     Unremarkable labs     UA positive     Today patient awake and alert. Wife at bedside  Urology saw patient and discussed possible surgery this evening since patient had lunch today.        Past Medical History:   Diagnosis Date    Kidney calculi      No past surgical history on file. No family history on file.   Social History     Tobacco Use    Smoking status: Never Smoker    Smokeless tobacco: Never Used   Substance Use Topics    Alcohol use: Not Currently      Current Facility-Administered Medications   Medication Dose Route Frequency Provider Last Rate Last Admin    0.9% sodium chloride infusion  125 mL/hr IntraVENous CONTINUOUS Jamey Fernandes  mL/hr at 04/28/22 0949 125 mL/hr at 04/28/22 0949    acetaminophen (TYLENOL) tablet 650 mg  650 mg Oral Q6H PRN Johanna Fernandes MD        Or   Sallie Wilder acetaminophen (TYLENOL) suppository 650 mg  650 mg Rectal Q6H PRN Johanna Fernandes MD        polyethylene glycol (MIRALAX) packet 17 g  17 g Oral DAILY PRN Johanna Fernandes MD        ondansetron (ZOFRAN ODT) tablet 4 mg  4 mg Oral Q8H PRN Jamey Fernandes MD        Or    ondansetron [facility]) injection 4 mg  4 mg IntraVENous Q6H PRN Johanna Fernandes MD   4 mg at 04/28/22 0949    enoxaparin (LOVENOX) injection 40 mg  40 mg SubCUTAneous DAILY Jamey Fernandes MD   40 mg at 04/28/22 4069    cefTRIAXone (ROCEPHIN) 1 g in sterile water (preservative free) 10 mL IV syringe  1 g IntraVENous Q24H Jamey Fernandes MD   1 g at 04/28/22 0950    traMADoL (ULTRAM) tablet 50 mg  50 mg Oral Q6H PRN Marva Godwin MD   50 mg at 04/28/22 1214        No Known Allergies    Review of Systems:  ROS    Review of Systems   Constitutional: Negative for chills and fever. Respiratory: Negative. Cardiovascular: Negative. Gastrointestinal: Positive for nausea. Genitourinary: Positive for flank pain. Neurological: Negative.          Objective:     Vitals:    04/28/22 0605 04/28/22 0626 04/28/22 0637 04/28/22 0749   BP: 102/70 104/67 115/73 108/67   Pulse: 60 62 72 60   Resp: 16 16 16 18   Temp:    97.6 °F (36.4 °C)   SpO2: 98% 99% 98% 97%   Weight:       Height:            Recent Labs     04/28/22  0100   WBC 7.4   HGB 15.3   HCT 45.8        Recent Labs     04/28/22  0100      K 3.9   *   CO2 27   *   BUN 13   CREA 1.18   CA 8.8   ALB 3.6   TBILI 0.6   ALT 24     No results for input(s): PH, PCO2, PO2, HCO3, FIO2 in the last 72 hours. 24 Hour Results:  Recent Results (from the past 24 hour(s))   CBC WITH AUTOMATED DIFF    Collection Time: 04/28/22  1:00 AM   Result Value Ref Range    WBC 7.4 4.1 - 11.1 K/uL    RBC 4.83 4.10 - 5.70 M/uL    HGB 15.3 12.1 - 17.0 g/dL    HCT 45.8 36.6 - 50.3 %    MCV 94.8 80.0 - 99.0 FL    MCH 31.7 26.0 - 34.0 PG    MCHC 33.4 30.0 - 36.5 g/dL    RDW 12.4 11.5 - 14.5 %    PLATELET 568 144 - 832 K/uL    MPV 11.1 8.9 - 12.9 FL    NRBC 0.0 0.0  WBC    ABSOLUTE NRBC 0.00 0.00 - 0.01 K/uL    NEUTROPHILS 62 32 - 75 %    LYMPHOCYTES 25 12 - 49 %    MONOCYTES 11 5 - 13 %    EOSINOPHILS 2 0 - 7 %    BASOPHILS 0 0 - 1 %    IMMATURE GRANULOCYTES 0 0 - 0.5 %    ABS. NEUTROPHILS 4.6 1.8 - 8.0 K/UL    ABS.  LYMPHOCYTES 1.8 0.8 - 3.5 K/UL    ABS. MONOCYTES 0.8 0.0 - 1.0 K/UL    ABS. EOSINOPHILS 0.1 0.0 - 0.4 K/UL    ABS. BASOPHILS 0.0 0.0 - 0.1 K/UL    ABS. IMM. GRANS. 0.0 0.00 - 0.04 K/UL    DF AUTOMATED     METABOLIC PANEL, COMPREHENSIVE    Collection Time: 04/28/22  1:00 AM   Result Value Ref Range    Sodium 141 136 - 145 mmol/L    Potassium 3.9 3.5 - 5.1 mmol/L    Chloride 109 (H) 97 - 108 mmol/L    CO2 27 21 - 32 mmol/L    Anion gap 5 5 - 15 mmol/L    Glucose 118 (H) 65 - 100 mg/dL    BUN 13 6 - 20 mg/dL    Creatinine 1.18 0.70 - 1.30 mg/dL    BUN/Creatinine ratio 11 (L) 12 - 20      GFR est AA >60 >60 ml/min/1.73m2    GFR est non-AA >60 >60 ml/min/1.73m2    Calcium 8.8 8.5 - 10.1 mg/dL    Bilirubin, total 0.6 0.2 - 1.0 mg/dL    AST (SGOT) 15 15 - 37 U/L    ALT (SGPT) 24 12 - 78 U/L    Alk. phosphatase 92 45 - 117 U/L    Protein, total 6.3 (L) 6.4 - 8.2 g/dL    Albumin 3.6 3.5 - 5.0 g/dL    Globulin 2.7 2.0 - 4.0 g/dL    A-G Ratio 1.3 1.1 - 2.2     URINALYSIS W/ REFLEX CULTURE    Collection Time: 04/28/22  6:07 AM    Specimen: Urine   Result Value Ref Range    Color Yellow/Straw      Appearance Turbid (A) Clear      Specific gravity 1.018 1.003 - 1.030      pH (UA) 5.0 5.0 - 8.0      Protein Negative Negative mg/dL    Glucose Negative Negative mg/dL    Ketone 5 (A) Negative mg/dL    Bilirubin Negative Negative      Blood Moderate (A) Negative      Urobilinogen 0.1 0.1 - 1.0 EU/dL    Nitrites Negative Negative      Leukocyte Esterase Large (A) Negative      WBC 20-50 0 - 4 /hpf    RBC >100 (H) 0 - 5 /hpf    Bacteria Negative Negative /hpf    UA:UC IF INDICATED Urine Culture Ordered (A) Culture not indicated by UA result      Mucus 3+ (A) Negative /lpf    Hyaline cast 5-10 0 - 5 /lpf       Physical Exam:  Physical Exam  HENT:      Head: Normocephalic and atraumatic. Cardiovascular:      Rate and Rhythm: Normal rate and regular rhythm.    Pulmonary:      Effort: Pulmonary effort is normal.      Breath sounds: Normal breath sounds. Abdominal:      Palpations: Abdomen is soft. Tenderness: There is left CVA tenderness. Skin:     General: Skin is warm and dry. Neurological:      General: No focal deficit present.       Mental Status: He is alert and oriented to person, place, and time.         Assessment:left ureteral stone, left renal colic     Hospital Problems  Never Reviewed          Codes Class Noted POA    Kidney stone ICD-10-CM: N20.0  ICD-9-CM: 592.0  4/28/2022 Unknown              Plan: Ureteroscopy laser to stone, stent placement with basket extraction of fragments he is aware the risk of bleeding infection injury to bladder urethra ureter vascular injury pulm and death he has no questions     Hospital Problems  Never Reviewed          Codes Class Noted POA    Kidney stone ICD-10-CM: N20.0  ICD-9-CM: 592.0  4/28/2022 Unknown                  Signed By: Ondina Garay MD     April 28, 2022

## 2022-04-28 NOTE — ED NOTES
TRANSFER - OUT REPORT:    Verbal report given to Janett(name) on [facility] [place].  being transferred to [facility](unit) for routine progression of care       Report consisted of patients Situation, Background, Assessment and   Recommendations(SBAR). Information from the following report(s) SBAR, ED Summary, [facility] and Recent Results was reviewed with the receiving nurse. Lines:   Peripheral IV 04/28/22 Right Antecubital (Active)   Site Assessment Clean, dry, & intact 04/28/22 0106   Phlebitis Assessment 0 04/28/22 0106   Infiltration Assessment 0 04/28/22 0106   Dressing Status Clean, dry, & intact 04/28/22 0106   Dressing Type Transparent 04/28/22 0106   Hub Color/Line Status Blue;Flushed;Patent 04/28/22 0106   Action Taken Blood drawn 04/28/22 0106        Opportunity for questions and clarification was provided.       Patient transported with:   [employer]

## 2022-04-28 NOTE — H&P
HISTORY & PHYSICAL      Patient: [address] [place] [address],[address] MRN: [id number]  SSN: [id number]    YOB: 1965  Age: 64 y.o. Sex: male      Primary Care Provider: None  Source of Information: patient      Subjective     CC:   Chief Complaint   Patient presents with    Flank Pain       HPI: [facility],[address]. is a(n) 64 y.o. male with PMH significant for kidney stones who present to ED with left flank pain that radiates to left groin. Patient reports having one kidney stone in past few years ago. Pain is similar to stone in past. C/o some nausea. Denies vomiting or trauma. CT abd pelv wo contrast - 6.5 mm obstructing stone left UPJ. Left perinephric inflammation. Correlate  for medical renal disease to include pyelonephritis. Unremarkable labs    UA positive    Today patient awake and alert. Wife at bedside  Urology saw patient and discussed possible surgery this evening since patient had lunch today. Past Medical History:   Diagnosis Date    Kidney calculi         No past surgical history on file. Prior to Admission medications    Not on File       No Known Allergies     No family history on file. Social History     Socioeconomic History    Marital status:    Tobacco Use    Smoking status: Never Smoker    Smokeless tobacco: Never Used   Substance and Sexual Activity    Alcohol use: Not Currently    Drug use: Never        Review of Systems   Constitutional: Negative for chills and fever. Respiratory: Negative. Cardiovascular: Negative. Gastrointestinal: Positive for nausea. Genitourinary: Positive for flank pain. Neurological: Negative.           CODE STATUS:  DNR    Full    Other        Objective     Visit Vitals  /67 (BP 1 Location: Left upper arm, BP Patient Position: At rest)   Pulse 60   Temp 97.6 °F (36.4 °C)   Resp 18   Ht 5' 11\" (1.803 m)   Wt 93 kg (205 lb)   SpO2 97%   BMI 28.59 kg/m²      O2 Device: None (Room air)    Physical Exam:   Physical Exam  HENT: Head: Normocephalic and atraumatic. Cardiovascular:      Rate and Rhythm: Normal rate and regular rhythm. Pulmonary:      Effort: Pulmonary effort is normal.      Breath sounds: Normal breath sounds. Abdominal:      Palpations: Abdomen is soft. Tenderness: There is left CVA tenderness. Skin:     General: Skin is warm and dry. Neurological:      General: No focal deficit present. Mental Status: He is alert and oriented to person, place, and time. Intake & Output:  Current Shift: No intake/output data recorded. Last three shifts: 04/26 1901 - 04/28 0700  In: 2000 [I.V.:2000]  Out: -     Lab/Data Review: All lab results for the last 24 hours reviewed. 24 Hour Results:    Recent Results (from the past 24 hour(s))   CBC WITH AUTOMATED DIFF    Collection Time: 04/28/22  1:00 AM   Result Value Ref Range    WBC 7.4 4.1 - 11.1 K/uL    RBC 4.83 4.10 - 5.70 M/uL    HGB 15.3 12.1 - 17.0 g/dL    HCT 45.8 36.6 - 50.3 %    MCV 94.8 80.0 - 99.0 FL    MCH 31.7 26.0 - 34.0 PG    MCHC 33.4 30.0 - 36.5 g/dL    RDW 12.4 11.5 - 14.5 %    PLATELET 577 331 - 548 K/uL    MPV 11.1 8.9 - 12.9 FL    NRBC 0.0 0.0  WBC    ABSOLUTE NRBC 0.00 0.00 - 0.01 K/uL    NEUTROPHILS 62 32 - 75 %    LYMPHOCYTES 25 12 - 49 %    MONOCYTES 11 5 - 13 %    EOSINOPHILS 2 0 - 7 %    BASOPHILS 0 0 - 1 %    IMMATURE GRANULOCYTES 0 0 - 0.5 %    ABS. NEUTROPHILS 4.6 1.8 - 8.0 K/UL    ABS. LYMPHOCYTES 1.8 0.8 - 3.5 K/UL    ABS. MONOCYTES 0.8 0.0 - 1.0 K/UL    ABS. EOSINOPHILS 0.1 0.0 - 0.4 K/UL    ABS. BASOPHILS 0.0 0.0 - 0.1 K/UL    ABS. IMM.  GRANS. 0.0 0.00 - 0.04 K/UL    DF AUTOMATED     METABOLIC PANEL, COMPREHENSIVE    Collection Time: 04/28/22  1:00 AM   Result Value Ref Range    Sodium 141 136 - 145 mmol/L    Potassium 3.9 3.5 - 5.1 mmol/L    Chloride 109 (H) 97 - 108 mmol/L    CO2 27 21 - 32 mmol/L    Anion gap 5 5 - 15 mmol/L    Glucose 118 (H) 65 - 100 mg/dL    BUN 13 6 - 20 mg/dL    Creatinine 1.18 0.70 - 1.30 mg/dL    BUN/Creatinine ratio 11 (L) 12 - 20      GFR est AA >60 >60 ml/min/1.73m2    GFR est non-AA >60 >60 ml/min/1.73m2    Calcium 8.8 8.5 - 10.1 mg/dL    Bilirubin, total 0.6 0.2 - 1.0 mg/dL    AST (SGOT) 15 15 - 37 U/L    ALT (SGPT) 24 12 - 78 U/L    Alk. phosphatase 92 45 - 117 U/L    Protein, total 6.3 (L) 6.4 - 8.2 g/dL    Albumin 3.6 3.5 - 5.0 g/dL    Globulin 2.7 2.0 - 4.0 g/dL    A-G Ratio 1.3 1.1 - 2.2     URINALYSIS W/ REFLEX CULTURE    Collection Time: 04/28/22  6:07 AM    Specimen: Urine   Result Value Ref Range    Color Yellow/Straw      Appearance Turbid (A) Clear      Specific gravity 1.018 1.003 - 1.030      pH (UA) 5.0 5.0 - 8.0      Protein Negative Negative mg/dL    Glucose Negative Negative mg/dL    Ketone 5 (A) Negative mg/dL    Bilirubin Negative Negative      Blood Moderate (A) Negative      Urobilinogen 0.1 0.1 - 1.0 EU/dL    Nitrites Negative Negative      Leukocyte Esterase Large (A) Negative      WBC 20-50 0 - 4 /hpf    RBC >100 (H) 0 - 5 /hpf    Bacteria Negative Negative /hpf    UA:UC IF INDICATED Urine Culture Ordered (A) Culture not indicated by UA result      Mucus 3+ (A) Negative /lpf    Hyaline cast 5-10 0 - 5 /lpf         Imaging:  [unfilled]    CT ABD PELV WO CONT   Final Result   1. 6.5 mm obstructing stone left UPJ. Left perinephric inflammation. Correlate   for medical renal disease to include pyelonephritis. 2. Nonobstructing bilateral nephrolithiasis.                      Assessment     Left sided kidney stone  Pyelonephritis  History of kidney stone    Plan     Rocephin 1 g every 24 hours for 5 doses  Lovenox 40 mg subcu daily    IV fluids    NPO  Consult urology  Possible surgery this evening      Current Facility-Administered Medications:     0.9% sodium chloride infusion, 125 mL/hr, IntraVENous, CONTINUOUS, Jamey Fernandes MD, Last Rate: 125 mL/hr at 04/28/22 0949, 125 mL/hr at 04/28/22 0949    acetaminophen (TYLENOL) tablet 650 mg, 650 mg, Oral, Q6H PRN **OR** acetaminophen (TYLENOL) suppository 650 mg, 650 mg, Rectal, Q6H PRN, London Fernandes MD    polyethylene glycol (MIRALAX) packet 17 g, 17 g, Oral, DAILY PRN, London Fernandes MD    ondansetron (ZOFRAN ODT) tablet 4 mg, 4 mg, Oral, Q8H PRN **OR** ondansetron (ZOFRAN) injection 4 mg, 4 mg, IntraVENous, Q6H PRN, Jamey Fernandes MD, 4 mg at 04/28/22 0949    enoxaparin (LOVENOX) injection 40 mg, 40 mg, SubCUTAneous, DAILY, Jamey Fernandes MD, 40 mg at 04/28/22 0951    cefTRIAXone (ROCEPHIN) 1 g in sterile water (preservative free) 10 mL IV syringe, 1 g, IntraVENous, Q24H, Jamey Fernandes MD, 1 g at 04/28/22 0950    traMADoL (ULTRAM) tablet 50 mg, 50 mg, Oral, Q6H PRN, Jamey Fernandes MD, 50 mg at 04/28/22 1214        Signed By: Susan Flannery     April 28, 2022

## 2022-04-28 NOTE — Clinical Note
[facility] EMERGENCY DEPT  [address] [place] [id number]  [phone]    Work/School Note    Date: 4/28/2022    To Whom It May concern: Moisés Bui. was seen and treated today in the emergency room by the following provider(s):  Attending Provider: Maria Eugenia Meier. is excused from work/school on 04/28/22 and 04/29/22. He is medically clear to return to work/school on 4/30/2022.        Sincerely,          Rosalba Conrad MD

## 2022-04-28 NOTE — PROGRESS NOTES
Reason for Admission:  Kidney Stone                     RUR Score:N/A                   Plan for utilizing home health: Declined         PCP: First and Last name:  None     Name of Practice:    Are you a current patient: Yes/No:    Approximate date of last visit:    Can you participate in a virtual visit with your PCP:                     Current Advanced Directive/Advance Care Plan: Full Code      Healthcare Decision Maker:   Click here to complete [address] including selection of the Healthcare Decision Maker Relationship (ie \"Primary\")           Clayton Estrada  [phone]                  Transition of Care Plan:                    CM discussed discharge plan with patient and wife at bedside. Patient will return home self care. Wife will assist as needed. Patient is independent of ADL/IADLcare. Patient has no pharmacy and drive self. Wife to transport at discharge.

## 2022-04-28 NOTE — ED PROVIDER NOTES
EMERGENCY DEPARTMENT HISTORY AND PHYSICAL EXAM      Date: 4/28/2022  Patient Name: Devyn Flores. History of Presenting Illness     Chief Complaint   Patient presents with    Flank Pain       History Provided By: Patient and Patient's Wife    HPI: Devyn Flores., 64 y.o. male with a past medical history significant No significant past medical history presents to the ED with chief complaint of Flank Pain  . D10year-old male with a remote history of kidney stones patient presents with severe sudden onset of left flank pain that radiates to the left groin. Pain is severe at a 10 out of 10 cannot get still no meds prior to arrival and mild nausea no vomiting no fevers. No trauma. Similar pain is a kidney stone in the past.          There are no other complaints, changes, or physical findings at this time. PCP: None        Past History     Past Medical History:  Past Medical History:   Diagnosis Date    Kidney calculi        Past Surgical History:  No past surgical history on file. Family History:  No family history on file. Social History:  Social History     Tobacco Use    Smoking status: Never Smoker    Smokeless tobacco: Never Used   Substance Use Topics    Alcohol use: Not Currently    Drug use: Never       Allergies:  No Known Allergies      Review of Systems   Review of Systems   Constitutional: Negative. Negative for chills, fatigue and fever. HENT: Negative. Negative for congestion, ear pain, nosebleeds and sore throat. Eyes: Negative. Negative for pain, discharge and visual disturbance. Respiratory: Negative. Negative for cough, chest tightness and shortness of breath. Cardiovascular: Negative. Negative for chest pain and leg swelling. Gastrointestinal: Positive for abdominal pain. Negative for blood in stool, constipation, diarrhea, nausea and vomiting. Endocrine: Negative. Genitourinary: Negative. Negative for difficulty urinating, dysuria and flank pain. Musculoskeletal: Positive for back pain. Negative for myalgias. Skin: Negative. Negative for rash and wound. Allergic/Immunologic: Negative. Neurological: Negative. Negative for dizziness, syncope, weakness, numbness and headaches. Hematological: Negative. Does not bruise/bleed easily. Psychiatric/Behavioral: Negative. Negative for agitation, confusion, hallucinations and suicidal ideas. All other systems reviewed and are negative. Physical Exam   Physical Exam  Vitals and nursing note reviewed. Constitutional:       General: He is in acute distress. Appearance: He is normal weight. He is not ill-appearing. Comments: Writhing in pain and cannot sit still. HENT:      Head: Normocephalic and atraumatic. Right Ear: External ear normal.      Left Ear: External ear normal.      Nose: Nose normal. No rhinorrhea. Mouth/Throat:      Mouth: Mucous membranes are moist.      Pharynx: Oropharynx is clear. Eyes:      Extraocular Movements: Extraocular movements intact. Conjunctiva/sclera: Conjunctivae normal.      Pupils: Pupils are equal, round, and reactive to light. Cardiovascular:      Rate and Rhythm: Regular rhythm. Tachycardia present. Pulses: Normal pulses. Heart sounds: Normal heart sounds. Pulmonary:      Effort: Pulmonary effort is normal. No respiratory distress. Breath sounds: Normal breath sounds. Abdominal:      General: Abdomen is flat. Bowel sounds are normal. There is no distension. Palpations: Abdomen is soft. Tenderness: There is no abdominal tenderness. Musculoskeletal:         General: No tenderness or deformity. Normal range of motion. Cervical back: Normal range of motion and neck supple. Skin:     General: Skin is warm and dry. Capillary Refill: Capillary refill takes less than 2 seconds. Findings: No bruising, lesion or rash. Neurological:      General: No focal deficit present.       Mental Status: He is alert and oriented to person, place, and time. Mental status is at baseline. Psychiatric:         Mood and Affect: Mood normal.         Behavior: Behavior normal.         Thought Content: Thought content normal.         Judgment: Judgment normal.         Diagnostic Study Results     Labs -     Recent Results (from the past 12 hour(s))   CBC WITH AUTOMATED DIFF    Collection Time: 04/28/22  1:00 AM   Result Value Ref Range    WBC 7.4 4.1 - 11.1 K/uL    RBC 4.83 4.10 - 5.70 M/uL    HGB 15.3 12.1 - 17.0 g/dL    HCT 45.8 36.6 - 50.3 %    MCV 94.8 80.0 - 99.0 FL    MCH 31.7 26.0 - 34.0 PG    MCHC 33.4 30.0 - 36.5 g/dL    RDW 12.4 11.5 - 14.5 %    PLATELET 098 808 - 092 K/uL    MPV 11.1 8.9 - 12.9 FL    NRBC 0.0 0.0  WBC    ABSOLUTE NRBC 0.00 0.00 - 0.01 K/uL    NEUTROPHILS 62 32 - 75 %    LYMPHOCYTES 25 12 - 49 %    MONOCYTES 11 5 - 13 %    EOSINOPHILS 2 0 - 7 %    BASOPHILS 0 0 - 1 %    IMMATURE GRANULOCYTES 0 0 - 0.5 %    ABS. NEUTROPHILS 4.6 1.8 - 8.0 K/UL    ABS. LYMPHOCYTES 1.8 0.8 - 3.5 K/UL    ABS. MONOCYTES 0.8 0.0 - 1.0 K/UL    ABS. EOSINOPHILS 0.1 0.0 - 0.4 K/UL    ABS. BASOPHILS 0.0 0.0 - 0.1 K/UL    ABS. IMM. GRANS. 0.0 0.00 - 0.04 K/UL    DF AUTOMATED     METABOLIC PANEL, COMPREHENSIVE    Collection Time: 04/28/22  1:00 AM   Result Value Ref Range    Sodium 141 136 - 145 mmol/L    Potassium 3.9 3.5 - 5.1 mmol/L    Chloride 109 (H) 97 - 108 mmol/L    CO2 27 21 - 32 mmol/L    Anion gap 5 5 - 15 mmol/L    Glucose 118 (H) 65 - 100 mg/dL    BUN 13 6 - 20 mg/dL    Creatinine 1.18 0.70 - 1.30 mg/dL    BUN/Creatinine ratio 11 (L) 12 - 20      GFR est AA >60 >60 ml/min/1.73m2    GFR est non-AA >60 >60 ml/min/1.73m2    Calcium 8.8 8.5 - 10.1 mg/dL    Bilirubin, total 0.6 0.2 - 1.0 mg/dL    AST (SGOT) 15 15 - 37 U/L    ALT (SGPT) 24 12 - 78 U/L    Alk.  phosphatase 92 45 - 117 U/L    Protein, total 6.3 (L) 6.4 - 8.2 g/dL    Albumin 3.6 3.5 - 5.0 g/dL    Globulin 2.7 2.0 - 4.0 g/dL    A-G Ratio 1.3 1.1 - 2.2           Radiologic Studies -   CT ABD PELV WO CONT   Final Result   1. 6.5 mm obstructing stone left UPJ. Left perinephric inflammation. Correlate   for medical renal disease to include pyelonephritis. 2. Nonobstructing bilateral nephrolithiasis. CT Results  (Last 48 hours)               04/28/22 0156  CT ABD PELV WO CONT Final result    Impression:  1. 6.5 mm obstructing stone left UPJ. Left perinephric inflammation. Correlate   for medical renal disease to include pyelonephritis. 2. Nonobstructing bilateral nephrolithiasis. Narrative:  CT ABDOMEN PELVIS       CLINICAL: Flank pain. COMPARISON: No comparisons are available under this PACs number at this time. .       TECHNIQUE: Axial imaging abdomen pelvis without IV contrast, multiplanar   reformatting. Dose reduction: All CT scans at this facility are performed using dose reduction   optimization techniques as appropriate to a performed exam including the   following: Automated exposure control, adjustments of the mA and/or kV according   to patient's size, or use of iterative reconstruction technique. FINDINGS:   LUNG BASES: Lingular scarring. LIVER: Left hepatic segment 2.4 cm cyst.   GALLBLADDER AND BILIARY TREE: No evident gallstones, gallbladder wall   thickening, or biliary ductal dilation. PANCREAS: Unremarkable for noncontrast technique. SPLEEN: 14 cm splenomegaly. ADRENALS: Unremarkable. KIDNEYS AND URETERS: Nonobstructing right nephrolithiasis and left   nephrolithiasis. Left hydronephrosis secondary to a 6 x 5 mm stone at the left   UPJ. BLADDER: Unremarkable. REPRODUCTIVE ORGANS: No pelvic masses. BOWEL: Small hiatal hernia. The bowel is not dilated. Visualized portion of the   appendix nondilated. LYMPH NODES:  No lymphadenopathy. PERITONEUM: Minimal left perinephric inflammation. No walled off fluid   collection, free air. VESSELS: Abdominal aorta normal caliber.    ABDOMINAL WALL: Tiny umbilical defect containing noninflamed fat. BONES: Unremarkable for age. CXR Results  (Last 48 hours)    None          Medical Decision Making and ED Course   I am the first provider for this patient. I reviewed the vital signs, available nursing notes, past medical history, past surgical history, family history and social history. Vital Signs-Reviewed the patient's vital signs. Patient Vitals for the past 12 hrs:   Temp Pulse Resp BP SpO2   04/28/22 0406 -- (!) 50 -- 112/69 96 %   04/28/22 0335 -- 61 16 96/72 96 %   04/28/22 0054 98 °F (36.7 °C) 67 16 116/62 99 %       EKG interpretation:         Records Reviewed: Previous Hospital chart. EMS run report      ED Course:   Initial assessment performed. The patients presenting problems have been discussed, and they are in agreement with the care plan formulated and outlined with them. I have encouraged them to ask questions as they arise throughout their visit. Orders Placed This Encounter    CT ABD PELV WO CONT     Standing Status:   Standing     Number of Occurrences:   1     Order Specific Question:   Type of contrast.  PLEASE NOTE: IV contrast is NOT utilized with this order.      Answer:   None     Order Specific Question:   Transport     Answer:   Stretcher [5]     Order Specific Question:   Reason for Exam     Answer:   flank pain     Order Specific Question:   Decision Support Exception     Answer:   Emergency Medical Condition (MA) [1]    URINALYSIS W/ REFLEX CULTURE     Standing Status:   Standing     Number of Occurrences:   1    CBC WITH AUTOMATED DIFF     Standing Status:   Standing     Number of Occurrences:   1    CMP     Standing Status:   Standing     Number of Occurrences:   1    ondansetron (ZOFRAN) injection 4 mg    HYDROmorphone (DILAUDID) injection 1 mg    ketorolac (TORADOL) injection 30 mg    sodium chloride 0.9 % bolus infusion 1,000 mL    DISCONTD: tamsulosin (FLOMAX) capsule 0.4 mg    sodium chloride 0.9 % bolus infusion 1,000 mL    tamsulosin (FLOMAX) capsule 0.4 mg    HYDROmorphone (DILAUDID) injection 1 mg                 Provider Notes (Medical Decision Making):   51-year-old male sudden onset left flank pain with radiation to the left groin patient cannot sit still differential includes torsion versus perforated viscus versus AAA versus kidney stone. She does show a large kidney stone at the UVJ with some hydronephrosis. Patient's pain is now controlled with pain medicine awaiting urine sample. Patient did need a second dose of Dilaudid to control his pain. Is able to urinate just a small amount. Consults                   Procedures                       Disposition       Emergency Department Disposition:        Diagnosis     Clinical Impression:   1. Kidney stone        Attestations:    Gilberto Marsh MD    Please note that this dictation was completed with [employer], the computer voice recognition software. Quite often unanticipated grammatical, syntax, homophones, and other interpretive errors are inadvertently transcribed by the computer software. Please disregard these errors. Please excuse any errors that have escaped final proofreading. Thank you.

## 2022-04-28 NOTE — PROGRESS NOTES
Problem: Pain  Goal: *Control of Pain  Outcome: Progressing Towards Goal  Goal: *PALLIATIVE CARE:  Alleviation of Pain  Outcome: Progressing Towards Goal     Problem: Kidney Stone Care Plan (Adult)  Goal: *Elimination of kidney stone(s)  4/28/2022 1057 by Sheridan Chu RN  Outcome: Progressing Towards Goal  4/28/2022 1056 by Sheridan Chu RN  Outcome: Progressing Towards Goal  Goal: *Control of acute pain  4/28/2022 1057 by Sheridan Chu RN  Outcome: Progressing Towards Goal  4/28/2022 1056 by Sheridan Chu RN  Outcome: Progressing Towards Goal

## 2022-04-29 VITALS
BODY MASS INDEX: 28.7 KG/M2 | TEMPERATURE: 98 F | WEIGHT: 205 LBS | HEIGHT: 71 IN | RESPIRATION RATE: 18 BRPM | DIASTOLIC BLOOD PRESSURE: 68 MMHG | HEART RATE: 68 BPM | OXYGEN SATURATION: 95 % | SYSTOLIC BLOOD PRESSURE: 109 MMHG

## 2022-04-29 LAB
ALBUMIN SERPL-MCNC: 2.7 G/DL (ref 3.5–5)
ALBUMIN/GLOB SERPL: 1.1 {RATIO} (ref 1.1–2.2)
ALP SERPL-CCNC: 85 U/L (ref 45–117)
ALT SERPL-CCNC: 18 U/L (ref 12–78)
ANION GAP SERPL CALC-SCNC: 4 MMOL/L (ref 5–15)
AST SERPL W P-5'-P-CCNC: 17 U/L (ref 15–37)
BACTERIA SPEC CULT: NORMAL
BASOPHILS # BLD: 0 K/UL (ref 0–0.1)
BASOPHILS NFR BLD: 0 % (ref 0–1)
BILIRUB SERPL-MCNC: 0.5 MG/DL (ref 0.2–1)
BUN SERPL-MCNC: 12 MG/DL (ref 6–20)
BUN/CREAT SERPL: 11 (ref 12–20)
CA-I BLD-MCNC: 7.2 MG/DL (ref 8.5–10.1)
CHLORIDE SERPL-SCNC: 114 MMOL/L (ref 97–108)
CO2 SERPL-SCNC: 24 MMOL/L (ref 21–32)
CREAT SERPL-MCNC: 1.1 MG/DL (ref 0.7–1.3)
DIFFERENTIAL METHOD BLD: ABNORMAL
EOSINOPHIL # BLD: 0 K/UL (ref 0–0.4)
EOSINOPHIL NFR BLD: 0 % (ref 0–7)
ERYTHROCYTE [DISTWIDTH] IN BLOOD BY AUTOMATED COUNT: 12.6 % (ref 11.5–14.5)
GLOBULIN SER CALC-MCNC: 2.4 G/DL (ref 2–4)
GLUCOSE SERPL-MCNC: 105 MG/DL (ref 65–100)
HCT VFR BLD AUTO: 41.4 % (ref 36.6–50.3)
HGB BLD-MCNC: 13.4 G/DL (ref 12.1–17)
IMM GRANULOCYTES # BLD AUTO: 0 K/UL (ref 0–0.04)
IMM GRANULOCYTES NFR BLD AUTO: 1 % (ref 0–0.5)
LYMPHOCYTES # BLD: 0.7 K/UL (ref 0.8–3.5)
LYMPHOCYTES NFR BLD: 12 % (ref 12–49)
MCH RBC QN AUTO: 31.6 PG (ref 26–34)
MCHC RBC AUTO-ENTMCNC: 32.4 G/DL (ref 30–36.5)
MCV RBC AUTO: 97.6 FL (ref 80–99)
MONOCYTES # BLD: 0.4 K/UL (ref 0–1)
MONOCYTES NFR BLD: 6 % (ref 5–13)
NEUTS SEG # BLD: 4.9 K/UL (ref 1.8–8)
NEUTS SEG NFR BLD: 81 % (ref 32–75)
NRBC # BLD: 0 K/UL (ref 0–0.01)
NRBC BLD-RTO: 0 PER 100 WBC
PLATELET # BLD AUTO: 157 K/UL (ref 150–400)
PMV BLD AUTO: 11.5 FL (ref 8.9–12.9)
POTASSIUM SERPL-SCNC: 4.3 MMOL/L (ref 3.5–5.1)
PROT SERPL-MCNC: 5.1 G/DL (ref 6.4–8.2)
RBC # BLD AUTO: 4.24 M/UL (ref 4.1–5.7)
SODIUM SERPL-SCNC: 142 MMOL/L (ref 136–145)
SPECIAL REQUESTS,SREQ: NORMAL
WBC # BLD AUTO: 6 K/UL (ref 4.1–11.1)

## 2022-04-29 PROCEDURE — 74011250637 HC RX REV CODE- 250/637: Performed by: UROLOGY

## 2022-04-29 PROCEDURE — 85025 COMPLETE CBC W/AUTO DIFF WBC: CPT

## 2022-04-29 PROCEDURE — 74011000250 HC RX REV CODE- 250: Performed by: FAMILY MEDICINE

## 2022-04-29 PROCEDURE — 80053 COMPREHEN METABOLIC PANEL: CPT

## 2022-04-29 PROCEDURE — 36415 COLL VENOUS BLD VENIPUNCTURE: CPT

## 2022-04-29 PROCEDURE — 74011250636 HC RX REV CODE- 250/636: Performed by: FAMILY MEDICINE

## 2022-04-29 PROCEDURE — 74011250637 HC RX REV CODE- 250/637: Performed by: FAMILY MEDICINE

## 2022-04-29 RX ORDER — CIPROFLOXACIN 500 MG/1
500 TABLET ORAL 2 TIMES DAILY
Qty: 10 TABLET | Refills: 0 | Status: SHIPPED | OUTPATIENT
Start: 2022-04-29 | End: 2022-06-13 | Stop reason: ALTCHOICE

## 2022-04-29 RX ORDER — PHENAZOPYRIDINE HYDROCHLORIDE 95 MG/1
95 TABLET ORAL
Qty: 10 TABLET | Refills: 0 | Status: SHIPPED | OUTPATIENT
Start: 2022-04-29 | End: 2022-06-13 | Stop reason: ALTCHOICE

## 2022-04-29 RX ORDER — TRAMADOL HYDROCHLORIDE 50 MG/1
50 TABLET ORAL
Qty: 10 TABLET | Refills: 0 | Status: SHIPPED | OUTPATIENT
Start: 2022-04-29 | End: 2022-05-02

## 2022-04-29 RX ADMIN — ONDANSETRON 4 MG: 2 INJECTION INTRAMUSCULAR; INTRAVENOUS at 10:56

## 2022-04-29 RX ADMIN — Medication 95 MG: at 01:02

## 2022-04-29 RX ADMIN — TRAMADOL HYDROCHLORIDE 50 MG: 50 TABLET, COATED ORAL at 10:56

## 2022-04-29 RX ADMIN — CEFTRIAXONE SODIUM 1 G: 1 INJECTION, POWDER, FOR SOLUTION INTRAMUSCULAR; INTRAVENOUS at 10:56

## 2022-04-29 RX ADMIN — TRAMADOL HYDROCHLORIDE 50 MG: 50 TABLET, COATED ORAL at 05:12

## 2022-04-29 RX ADMIN — SODIUM CHLORIDE 125 ML/HR: 9 INJECTION, SOLUTION INTRAVENOUS at 05:12

## 2022-04-29 RX ADMIN — Medication 95 MG: at 08:11

## 2022-04-29 NOTE — DISCHARGE INSTRUCTIONS
Discharge Instructions       PATIENT ID: Radames Haney MRN: [id number]   YOB: 1965    DATE OF ADMISSION: 4/28/2022 12:56 AM    DATE OF DISCHARGE: 4/29/2022    PRIMARY CARE PROVIDER: None     ATTENDING PHYSICIAN: Raza Huynh MD  DISCHARGING PROVIDER: iNng Ann MD    To contact this individual call [phone] and ask the  to page. If unavailable ask to be transferred the Adult Hospitalist Department. DISCHARGE DIAGNOSES kidney stones    CONSULTATIONS: IP CONSULT TO UROLOGY    PROCEDURES/SURGERIES: Procedure(s):  LEFT URETEROSCOPY W/ LASER, STENT PLACEMENT    PENDING TEST RESULTS:   At the time of discharge the following test results are still pending: Urine cultures    FOLLOW UP APPOINTMENTS:   Follow-up Information     Follow up With Specialties Details Why Amish Turk MD Urology   [address] [place] [address]  [phone]      None    None (452) Patient stated that they have no PCP      Raza Huynh MD Family Medicine In 1 week  [address]  [phone]             ADDITIONAL CARE RECOMMENDATIONS: Follow-up with urology    DIET: Regular Diet      ACTIVITY: Activity as tolerated    Wound care: Wound Care Order: submitted to [facility] Please view [email or web address]    EQUIPMENT needed: None      DISCHARGE MEDICATIONS:   See Medication Reconciliation Form    · It is important that you take the medication exactly as they are prescribed. · Keep your medication in the bottles provided by the pharmacist and keep a list of the medication names, dosages, and times to be taken in your wallet. · Do not take other medications without consulting your doctor. NOTIFY YOUR PHYSICIAN FOR ANY OF THE FOLLOWING:   Fever over 101 degrees for 24 hours. Chest pain, shortness of breath, fever, chills, nausea, vomiting, diarrhea, change in mentation, falling, weakness, bleeding. Severe pain or pain not relieved by medications. Or, any other signs or symptoms that you may have questions about. DISPOSITION:    Home With:   OT  PT  HH  RN       SNF/Inpatient Rehab/LTAC    Independent/assisted living    Hospice    Other:         PROBLEM LIST Updated:  Yes ***       Signed:   Nicola Nicole MD  4/29/2022  11:00 AM     Discharge Instructions       PATIENT ID: Phoenix Self MRN: [id number]   YOB: 1965    DATE OF ADMISSION: 4/28/2022 12:56 AM    DATE OF DISCHARGE: 4/29/2022    PRIMARY CARE PROVIDER: None     ATTENDING PHYSICIAN: Amara Sunshine MD  DISCHARGING PROVIDER: Nicola Nicole MD    To contact this individual call [phone] and ask the  to page. If unavailable ask to be transferred the Adult Hospitalist Department. DISCHARGE DIAGNOSES ***    CONSULTATIONS: IP CONSULT TO UROLOGY    PROCEDURES/SURGERIES: Procedure(s):  LEFT URETEROSCOPY W/ LASER, STENT PLACEMENT    PENDING TEST RESULTS:   At the time of discharge the following test results are still pending: ***    FOLLOW UP APPOINTMENTS:   Follow-up Information     Follow up With Specialties Details Why Es Calloway MD Urology   [address] [place] [address]  [phone]      None    None (244) Patient stated that they have no PCP      Amara Sunshine MD Family Medicine In 1 week  [address]  [phone]             ADDITIONAL CARE RECOMMENDATIONS: ***    DIET: {diet:11796}      ACTIVITY: {discharge activity:75402}    Wound care: {[facility]}    EQUIPMENT needed: ***      DISCHARGE MEDICATIONS:   See Medication Reconciliation Form    · It is important that you take the medication exactly as they are prescribed. · Keep your medication in the bottles provided by the pharmacist and keep a list of the medication names, dosages, and times to be taken in your wallet.    · Do not take other medications without consulting your doctor. NOTIFY YOUR PHYSICIAN FOR ANY OF THE FOLLOWING:   Fever over 101 degrees for 24 hours. Chest pain, shortness of breath, fever, chills, nausea, vomiting, diarrhea, change in mentation, falling, weakness, bleeding. Severe pain or pain not relieved by medications. Or, any other signs or symptoms that you may have questions about. DISPOSITION:    Home With:   OT  PT  HH  RN       SNF/Inpatient Rehab/LTAC    Independent/assisted living    Hospice    Other:         PROBLEM LIST Updated:  Yes ***       Signed:   Ning Ann MD  4/29/2022  11:00 AM     Discharge Instructions       PATIENT ID: Radames Haney MRN: [id number]   YOB: 1965    DATE OF ADMISSION: 4/28/2022 12:56 AM    DATE OF DISCHARGE: 4/29/2022    PRIMARY CARE PROVIDER: None     ATTENDING PHYSICIAN: Raza Huynh MD  DISCHARGING PROVIDER: Ning Ann MD    To contact this individual call [phone] and ask the  to page. If unavailable ask to be transferred the Adult Hospitalist Department.     DISCHARGE DIAGNOSES ***    CONSULTATIONS: IP CONSULT TO UROLOGY    PROCEDURES/SURGERIES: Procedure(s):  LEFT URETEROSCOPY W/ LASER, STENT PLACEMENT    PENDING TEST RESULTS:   At the time of discharge the following test results are still pending: ***    FOLLOW UP APPOINTMENTS:   Follow-up Information     Follow up With Specialties Details Why Behzad Sanchez MD Urology   [address] [place] [address]  [phone]      None    None (165) Patient stated that they have no PCP      Raza Huynh MD Family Medicine In 1 week  [address]  [phone]             ADDITIONAL CARE RECOMMENDATIONS: ***    DIET: {diet:79849}      ACTIVITY: {discharge activity:44376}    Wound care: [facility]}    EQUIPMENT needed: ***      DISCHARGE MEDICATIONS:   See Medication Reconciliation Form    · It is important that you take the medication exactly as they are prescribed. · Keep your medication in the bottles provided by the pharmacist and keep a list of the medication names, dosages, and times to be taken in your wallet. · Do not take other medications without consulting your doctor. NOTIFY YOUR PHYSICIAN FOR ANY OF THE FOLLOWING:   Fever over 101 degrees for 24 hours. Chest pain, shortness of breath, fever, chills, nausea, vomiting, diarrhea, change in mentation, falling, weakness, bleeding. Severe pain or pain not relieved by medications. Or, any other signs or symptoms that you may have questions about.       DISPOSITION:    Home With:   OT  PT  HH  RN       SNF/Inpatient Rehab/LTAC    Independent/assisted living    Hospice    Other:         PROBLEM LIST Updated:  Yes ***       Signed:   Jessica Martinez MD  4/29/2022  11:00 AM

## 2022-04-29 NOTE — ANESTHESIA PREPROCEDURE EVALUATION
Relevant Problems   RENAL FAILURE   (+) Kidney stone       Anesthetic History   No history of anesthetic complications            Review of Systems / Medical History  Patient summary reviewed, nursing notes reviewed and pertinent labs reviewed    Pulmonary  Within defined limits                 Neuro/Psych   Within defined limits           Cardiovascular  Within defined limits                     GI/Hepatic/Renal         Renal disease: stones       Endo/Other        Obesity     Other Findings   Comments:     Procedure Information    Case: [id number] Date/Time: 04/28/22 2005  Procedure: LEFT URETEROSCOPY W/ LASER (Left )  Anesthesia type: General  Diagnosis: Ureteral stone (N20.1)  Pre-op diagnosis: Ureteral stone (N20.1)  Location: [facility]  Surgeons: Esme Chow MD    Medical History  Kidney calculi           Physical Exam    Airway  Mallampati: III  TM Distance: 4 - 6 cm  Neck ROM: normal range of motion   Mouth opening: Normal     Cardiovascular    Rhythm: regular  Rate: normal         Dental  No notable dental hx       Pulmonary  Breath sounds clear to auscultation               Abdominal  GI exam deferred      Comments: Study Result    Narrative & Impression  CT ABDOMEN PELVIS     CLINICAL: Flank pain.     COMPARISON: No comparisons are available under this PACs number at this time. .     TECHNIQUE: Axial imaging abdomen pelvis without IV contrast, multiplanar  reformatting. Dose reduction: All CT scans at this facility are performed using dose reduction  optimization techniques as appropriate to a performed exam including the  following: Automated exposure control, adjustments of the mA and/or kV according  to patient's size, or use of iterative reconstruction technique.     FINDINGS:  LUNG BASES: Lingular scarring. LIVER: Left hepatic segment 2.4 cm cyst.  GALLBLADDER AND BILIARY TREE: No evident gallstones, gallbladder wall  thickening, or biliary ductal dilation.   PANCREAS: Unremarkable for noncontrast technique. SPLEEN: 14 cm splenomegaly. ADRENALS: Unremarkable. KIDNEYS AND URETERS: Nonobstructing right nephrolithiasis and left  nephrolithiasis. Left hydronephrosis secondary to a 6 x 5 mm stone at the left  UPJ. BLADDER: Unremarkable. REPRODUCTIVE ORGANS: No pelvic masses. BOWEL: Small hiatal hernia. The bowel is not dilated. Visualized portion of the  appendix nondilated. LYMPH NODES:  No lymphadenopathy. PERITONEUM: Minimal left perinephric inflammation. No walled off fluid  collection, free air. VESSELS: Abdominal aorta normal caliber. ABDOMINAL WALL: Tiny umbilical defect containing noninflamed fat. BONES: Unremarkable for age.     IMPRESSION  1. 6.5 mm obstructing stone left UPJ. Left perinephric inflammation. Correlate  for medical renal disease to include pyelonephritis. 2. Nonobstructing bilateral nephrolithiasis.      Other Findings   Comments: Results for [employer] (MRN [id number]) as of 4/28/2022 20:39    4/28/2022 01:00  WBC: 7.4  NRBC: 0.0  RBC: 4.83  HGB: 15.3  HCT: 45.8  MCV: 94.8  MCH: 31.7  MCHC: 33.4  RDW: 12.4  PLATELET: 290    Results for [employer] (MRN [id number]) as of 4/28/2022 20:39    4/28/2022 01:00  Sodium: 141  Potassium: 3.9  Chloride: 109 (H)  CO2: 27  Anion gap: 5  Glucose: 118 (H)  BUN: 13  Creatinine: 1.18  BUN/Creatinine ratio: 11 (L)  Calcium: 8.8  GFR est non-AA: >60  GFR est AA: >60  Bilirubin, total: 0.6  Protein, total: 6.3 (L)  Albumin: 3.6  Globulin: 2.7  A-G Ratio: 1.3  ALT: 24  AST: 15  Alk.  phosphatase: 92         Anesthetic Plan    ASA: 2  Anesthesia type: general          Induction: Intravenous  Anesthetic plan and risks discussed with: Patient and Spouse

## 2022-04-29 NOTE — DISCHARGE SUMMARY
Discharge Summary       PATIENT ID: Rochelle Bello MRN: [id number]   YOB: 1965    DATE OF ADMISSION: 4/28/2022 12:56 AM    DATE OF DISCHARGE:   PRIMARY CARE PROVIDER: None     ATTENDING PHYSICIAN: Zeke Fernandes  DISCHARGING PROVIDER: Zeke Fernandes      CONSULTATIONS: IP CONSULT TO UROLOGY    PROCEDURES/SURGERIES: Procedure(s):  LEFT URETEROSCOPY W/ LASER, STENT PLACEMENT    ADMITTING DIAGNOSES:    Patient Active Problem List    Diagnosis Date Noted    Kidney stone 04/28/2022       DISCHARGE DIAGNOSES / PLAN:      Ureteroscopy nephroscopy laser to stones that ureteropelvic junction removal stone fragments diagnostic fluoroscopy cystoscopy and double-J stent placed    Left sided kidney stone 6.6   Pyelonephritis  History of kidney stone         DISCHARGE MEDICATIONS:  Current Discharge Medication List      START taking these medications    Details   phenazopyridine (PYRIDIUM) 95 mg tab Take 1 Tablet by mouth three (3) times daily as needed for Pain. Qty: 10 Tablet, Refills: 0  Start date: 4/29/2022      traMADoL (ULTRAM) 50 mg tablet Take 1 Tablet by mouth every six (6) hours as needed for Pain for up to 3 days. Max Daily Amount: 200 mg. Qty: 10 Tablet, Refills: 0  Start date: 4/29/2022, End date: 5/2/2022    Associated Diagnoses: Kidney stone      ciprofloxacin HCl (Cipro) 500 mg tablet Take 1 Tablet by mouth two (2) times a day. Qty: 10 Tablet, Refills: 0  Start date: 4/29/2022               NOTIFY YOUR PHYSICIAN FOR ANY OF THE FOLLOWING:   Fever over 101 degrees for 24 hours. Chest pain, shortness of breath, fever, chills, nausea, vomiting, diarrhea, change in mentation, falling, weakness, bleeding. Severe pain or pain not relieved by medications. Or, any other signs or symptoms that you may have questions about.     DISPOSITION:  x  Home With:   OT  PT  HH  RN       Long term SNF/Inpatient Rehab    Independent/assisted living    Hospice    Other:       PATIENT CONDITION AT DISCHARGE: Stable      PHYSICAL EXAMINATION AT DISCHARGE:  General:          Alert, cooperative, no distress, appears stated age. HEENT:           Atraumatic, anicteric sclerae, pink conjunctivae                          No oral ulcers, mucosa moist, throat clear, dentition fair  Neck:               Supple, symmetrical  Lungs:             Clear to auscultation bilaterally. No Wheezing or Rhonchi. No rales. Chest wall:      No tenderness  No Accessory muscle use. Heart:              Regular  rhythm,  No  murmur   No edema  Abdomen:        Soft, non-tender. Not distended. Bowel sounds normal  Extremities:     No cyanosis. No clubbing,                            Skin turgor normal, Capillary refill normal  Skin:                Not pale. Not Jaundiced  No rashes   Psych:             Not anxious or agitated. Neurologic:      Alert, moves all extremities, answers questions appropriately and responds to commands     CT ABD PELV WO CONT   Final Result   1. 6.5 mm obstructing stone left UPJ. Left perinephric inflammation. Correlate   for medical renal disease to include pyelonephritis. 2. Nonobstructing bilateral nephrolithiasis. XR FLUOROSCOPY UNDER 60 MINUTES    (Results Pending)        Recent Results (from the past 24 hour(s))   METABOLIC PANEL, COMPREHENSIVE    Collection Time: 04/29/22  6:51 AM   Result Value Ref Range    Sodium 142 136 - 145 mmol/L    Potassium 4.3 3.5 - 5.1 mmol/L    Chloride 114 (H) 97 - 108 mmol/L    CO2 24 21 - 32 mmol/L    Anion gap 4 (L) 5 - 15 mmol/L    Glucose 105 (H) 65 - 100 mg/dL    BUN 12 6 - 20 mg/dL    Creatinine 1.10 0.70 - 1.30 mg/dL    BUN/Creatinine ratio 11 (L) 12 - 20      GFR est AA >60 >60 ml/min/1.73m2    GFR est non-AA >60 >60 ml/min/1.73m2    Calcium 7.2 (L) 8.5 - 10.1 mg/dL    Bilirubin, total 0.5 0.2 - 1.0 mg/dL    AST (SGOT) 17 15 - 37 U/L    ALT (SGPT) 18 12 - 78 U/L    Alk.  phosphatase 85 45 - 117 U/L    Protein, total 5.1 (L) 6.4 - 8.2 g/dL    Albumin 2.7 (L) 3.5 - 5.0 g/dL    Globulin 2.4 2.0 - 4.0 g/dL    A-G Ratio 1.1 1.1 - 2.2     CBC WITH AUTOMATED DIFF    Collection Time: 04/29/22  6:51 AM   Result Value Ref Range    WBC 6.0 4.1 - 11.1 K/uL    RBC 4.24 4.10 - 5.70 M/uL    HGB 13.4 12.1 - 17.0 g/dL    HCT 41.4 36.6 - 50.3 %    MCV 97.6 80.0 - 99.0 FL    MCH 31.6 26.0 - 34.0 PG    MCHC 32.4 30.0 - 36.5 g/dL    RDW 12.6 11.5 - 14.5 %    PLATELET 891 669 - 941 K/uL    MPV 11.5 8.9 - 12.9 FL    NRBC 0.0 0.0  WBC    ABSOLUTE NRBC 0.00 0.00 - 0.01 K/uL    NEUTROPHILS 81 (H) 32 - 75 %    LYMPHOCYTES 12 12 - 49 %    MONOCYTES 6 5 - 13 %    EOSINOPHILS 0 0 - 7 %    BASOPHILS 0 0 - 1 %    IMMATURE GRANULOCYTES 1 (H) 0 - 0.5 %    ABS. NEUTROPHILS 4.9 1.8 - 8.0 K/UL    ABS. LYMPHOCYTES 0.7 (L) 0.8 - 3.5 K/UL    ABS. MONOCYTES 0.4 0.0 - 1.0 K/UL    ABS. EOSINOPHILS 0.0 0.0 - 0.4 K/UL    ABS. BASOPHILS 0.0 0.0 - 0.1 K/UL    ABS. IMM. GRANS. 0.0 0.00 - 0.04 K/UL    DF AUTOMATED            HOSPITAL COURSE:    Jean-Pierre Leiva is a(n) 64 y.o. male with PMH significant for kidney stones who present to ED with left flank pain that radiates to left groin. Patient reports having one kidney stone in past few years ago. Pain is similar to stone in past. C/o some nausea. Denies vomiting or trauma.      CT abd pelv wo contrast - 6.5 mm obstructing stone left UPJ. Left perinephric inflammation.  Correlate  for medical renal disease to include pyelonephritis    Seen by urology cystoscopy done yesterday showed    Ureteroscopy nephroscopy laser to stones that ureteropelvic junction removal stone fragments diagnostic fluoroscopy cystoscopy and double-J stent placed    Patient have pain while urinating  Otherwise patient remained stable no chest pain shortness of breath nausea vomiting diarrhea no constipation      Discharge home and follow-up with urology      Follow-up with urology in 1-2      Signed:   Boo Monsivais MD  4/29/2022  11:00 AM

## 2022-04-29 NOTE — PERIOP NOTES
TRANSFER - OUT REPORT:    Verbal report given to Bennett Esposito RN (name) on Madisyn Broderick.  being transferred to [facility] (unit) for routine post - op       Report consisted of patients Situation, Background, Assessment and   Recommendations(SBAR). Information from the following report(s) SBAR, Procedure Summary, Intake/Output and MAR was reviewed with the receiving nurse. Opportunity for questions and clarification was provided.       Patient transported with:   O2 @ 2 liters  Registered Nurse

## 2022-04-29 NOTE — OP NOTES
Ureteroscopy nephroscopy laser to stones that ureteropelvic junction removal stone fragments diagnostic fluoroscopy cystoscopy and double-J stent placed  Preop diagnosis was UPJ stone with obstruction severe pain  Postop diagnosis same  Surgeon-Darryn  Anesthesia-General  Complication-without  EBL less than 1 cc  Assistant none  Indication       Patient has a 6 to 7 mm stone around his ureteropelvic junction he set up for a laser to the stone and remove and double-J stent placement. He is aware the risk of bleeding infection injury to the bladder, ureter, UPJ kidney pulmonary embolus, death he has no questions  Procedure patient was prepped and draped in usual sterile fashion after undergoing general anesthesia placed lithotomy position. Timeout was performed. SCDs were placed. Preoperative antibiotics were given. Patient was scope with a #21 Western Ping straight cystoscope. Had normal pendulous bulbous membranous urethra. Medium size prostatic urethra with elevation of the bladder neck and little friable tissue there. The ureteral orifice was too small to pass a wire up just through the cystoscope so I took a cystoscope out placed in a semirigid ureteroscope inside the bladder up to the left ureteral orifice and passed a Glidewire up the left ureteral orifice under diagnostic fluoroscopy up to the kidney. I was able to identify the stone at UPJ under fluoroscopy with a wire going right by it. I then gently passed the flexible/semirigid scope which is a 4 Western Ping up the left ureteral orifice all the way to the kidney without damaging the ureter. He had a tight UPJ. Able to scope chest around the bend and under fluoroscopy was able to see the stone and put a basket to the stone and pull the stone towards the UPJ.   The stone was too big to go through then cut off the back wire sheath of the basket leaving the basket wrapped around the stone at the UPJ remove the ureteroscope and the sheath of the basket and then reintroduced ureteroscope next to the wire retained the basket all the way to the UPJ. And use a 200 µm fiber laser holmium laser low power settings and basically dusted the stone was many small pieces came off and is able to pull the larger pieces after placing another basket and removing the first basket. No complications, stones were sent off for analysis. I put a 0.35 guidewire up into the kidney. Then under diagnostic fluoroscopy, I  backloaded the wire through the cystoscope I passed a 7-22/30 double-J stent over the wire up into the kidney under diagnostic fluoroscopy.   Put a 16 a belladonna and opium suppository in the rectum Xylocaine per urethra and took off the table to recovery room without complication-the stent was curled nicely in the kidney and the bladder at the end of the procedure

## 2022-04-29 NOTE — ANESTHESIA POSTPROCEDURE EVALUATION
Procedure(s):  LEFT URETEROSCOPY W/ LASER, STENT PLACEMENT.     general    Anesthesia Post Evaluation      Multimodal analgesia: multimodal analgesia used between 6 hours prior to anesthesia start to PACU discharge  Patient location during evaluation: PACU  Patient participation: complete - patient participated  Level of consciousness: awake  Pain score: 0  Pain management: adequate  Airway patency: patent  Anesthetic complications: no  Cardiovascular status: acceptable  Respiratory status: acceptable  Hydration status: acceptable  Post anesthesia nausea and vomiting:  controlled  Final Post Anesthesia Temperature Assessment:  Normothermia (36.0-37.5 degrees C)      INITIAL Post-op Vital signs:   Vitals Value Taken Time   /79 04/28/22 2232   Temp 36.6 °C (97.9 °F) 04/28/22 2232   Pulse 78 04/28/22 2232   Resp 17 04/28/22 2232   SpO2 95 % 04/28/22 2232

## 2022-04-29 NOTE — PROGRESS NOTES
Patient returned to unit from PACU. Report received at bedside. Pt appears to be resting comfortably. Will continue to monitor.

## 2022-04-29 NOTE — PROGRESS NOTES
Patient to discharge home today self care after urology clearance. Wife will assist as needed. No further services needed by CM. Primary nurse aware.

## 2022-04-29 NOTE — PROGRESS NOTES
Patient discharged home self care. IV and telemetry removed. Prescriptions sent to pharmacy; follow up appointments scheduled; to return Monday to Dr. Rena Garcia for stent removal. MD aware of discharge; urology clearance. No further questions or concerns at this time. Patient wheeled down front to ride home with wife. Belongings packed at bedside; patient dressed self. VSS.

## 2022-04-29 NOTE — PROGRESS NOTES
PROGRESS NOTE    Patient: [address] [place] [address],[address] MRN: [id number]  SSN: [id number]    YOB: 1965  Age: 64 y.o. Sex: male      Admit Date: 4/28/2022    LOS: 1 day       Subjective     Chief Complaint   Patient presents with    Flank Pain       HPI: [address] [place] [address],[address]. is a(n) 64 y.o. male with PMH significant for kidney stones who present to ED with left flank pain that radiates to left groin. Patient reports having one kidney stone in past few years ago. Pain is similar to stone in past. C/o some nausea. Denies vomiting or trauma.      CT abd pelv wo contrast - 6.5 mm obstructing stone left UPJ. Left perinephric inflammation. Correlate  for medical renal disease to include pyelonephritis.     Unremarkable labs     UA positive     Today patient awake and alert. Wife at bedside  Urology saw patient and discussed possible surgery this evening since patient had lunch today. Review of Systems   Constitutional: Negative for chills and fever. Respiratory: Negative. Cardiovascular: Negative. Gastrointestinal: Positive for nausea. Genitourinary: Positive for flank pain. Neurological: Negative. Objective     Visit Vitals  BP (!) 97/52 (BP 1 Location: Left upper arm, BP Patient Position: At rest;Supine)   Pulse 60   Temp 98.2 °F (36.8 °C)   Resp 18   Ht 5' 11\" (1.803 m)   Wt 93 kg (205 lb)   SpO2 97%   BMI 28.59 kg/m²    O2 Flow Rate (L/min): 2 l/min O2 Device: Nasal cannula    Physical Exam:   Physical Exam  HENT:      Head: Normocephalic and atraumatic. Cardiovascular:      Rate and Rhythm: Normal rate and regular rhythm. Pulmonary:      Effort: Pulmonary effort is normal.      Breath sounds: Normal breath sounds. Abdominal:      Palpations: Abdomen is soft. Tenderness: There is left CVA tenderness. Skin:     General: Skin is warm and dry. Neurological:      General: No focal deficit present. Mental Status: He is alert and oriented to person, place, and time.      Intake & Output:  Current Shift: No intake/output data recorded. Last three shifts: 04/27 1901 - 04/29 0700  In: 3400 [I.V.:3400]  Out: 275 [Urine:275]    Lab/Data Review: All lab results for the last 24 hours reviewed. 24 Hour Results:    Recent Results (from the past 24 hour(s))   METABOLIC PANEL, COMPREHENSIVE    Collection Time: 04/29/22  6:51 AM   Result Value Ref Range    Sodium 142 136 - 145 mmol/L    Potassium 4.3 3.5 - 5.1 mmol/L    Chloride 114 (H) 97 - 108 mmol/L    CO2 24 21 - 32 mmol/L    Anion gap 4 (L) 5 - 15 mmol/L    Glucose 105 (H) 65 - 100 mg/dL    BUN 12 6 - 20 mg/dL    Creatinine 1.10 0.70 - 1.30 mg/dL    BUN/Creatinine ratio 11 (L) 12 - 20      GFR est AA >60 >60 ml/min/1.73m2    GFR est non-AA >60 >60 ml/min/1.73m2    Calcium 7.2 (L) 8.5 - 10.1 mg/dL    Bilirubin, total 0.5 0.2 - 1.0 mg/dL    AST (SGOT) 17 15 - 37 U/L    ALT (SGPT) 18 12 - 78 U/L    Alk. phosphatase 85 45 - 117 U/L    Protein, total 5.1 (L) 6.4 - 8.2 g/dL    Albumin 2.7 (L) 3.5 - 5.0 g/dL    Globulin 2.4 2.0 - 4.0 g/dL    A-G Ratio 1.1 1.1 - 2.2     CBC WITH AUTOMATED DIFF    Collection Time: 04/29/22  6:51 AM   Result Value Ref Range    WBC 6.0 4.1 - 11.1 K/uL    RBC 4.24 4.10 - 5.70 M/uL    HGB 13.4 12.1 - 17.0 g/dL    HCT 41.4 36.6 - 50.3 %    MCV 97.6 80.0 - 99.0 FL    MCH 31.6 26.0 - 34.0 PG    MCHC 32.4 30.0 - 36.5 g/dL    RDW 12.6 11.5 - 14.5 %    PLATELET 961 295 - 107 K/uL    MPV 11.5 8.9 - 12.9 FL    NRBC 0.0 0.0  WBC    ABSOLUTE NRBC 0.00 0.00 - 0.01 K/uL    NEUTROPHILS 81 (H) 32 - 75 %    LYMPHOCYTES 12 12 - 49 %    MONOCYTES 6 5 - 13 %    EOSINOPHILS 0 0 - 7 %    BASOPHILS 0 0 - 1 %    IMMATURE GRANULOCYTES 1 (H) 0 - 0.5 %    ABS. NEUTROPHILS 4.9 1.8 - 8.0 K/UL    ABS. LYMPHOCYTES 0.7 (L) 0.8 - 3.5 K/UL    ABS. MONOCYTES 0.4 0.0 - 1.0 K/UL    ABS. EOSINOPHILS 0.0 0.0 - 0.4 K/UL    ABS. BASOPHILS 0.0 0.0 - 0.1 K/UL    ABS. IMM.  GRANS. 0.0 0.00 - 0.04 K/UL    DF AUTOMATED           Imaging:    CT ABD PELV WO CONT   Final Result   1. 6.5 mm obstructing stone left UPJ. Left perinephric inflammation. Correlate   for medical renal disease to include pyelonephritis. 2. Nonobstructing bilateral nephrolithiasis.             XR FLUOROSCOPY UNDER 60 MINUTES    (Results Pending)          Assessment     Left sided kidney stone 6.6/ s/p ureteroscopy laser and stent placement   Pyelonephritis  History of kidney stone    Plan     Rocephin 1 g every 24 hours for 5 doses  Lovenox 40 mg subcu daily     IV fluids     Patient had uteroscopy laser and stent placed last night with urology    Possible discharge      Current Facility-Administered Medications:     0.9% sodium chloride infusion, 125 mL/hr, IntraVENous, CONTINUOUS, Jamey Fernandes MD, Last Rate: 125 mL/hr at 04/29/22 0512, 125 mL/hr at 04/29/22 2920    acetaminophen (TYLENOL) tablet 650 mg, 650 mg, Oral, Q6H PRN **OR** acetaminophen (TYLENOL) suppository 650 mg, 650 mg, Rectal, Q6H PRN, Jamey Fernandes MD    polyethylene glycol (MIRALAX) packet 17 g, 17 g, Oral, DAILY PRN, Priimtivo Fernandes MD    ondansetron (ZOFRAN ODT) tablet 4 mg, 4 mg, Oral, Q8H PRN **OR** ondansetron (ZOFRAN) injection 4 mg, 4 mg, IntraVENous, Q6H PRN, Jamey Fernandes MD, 4 mg at 04/28/22 1919    enoxaparin (LOVENOX) injection 40 mg, 40 mg, SubCUTAneous, DAILY, Jamey Fernandes MD, 40 mg at 04/28/22 0951    cefTRIAXone (ROCEPHIN) 1 g in sterile water (preservative free) 10 mL IV syringe, 1 g, IntraVENous, Q24H, Jamey Fernandes MD, 1 g at 04/28/22 0950    traMADoL (ULTRAM) tablet 50 mg, 50 mg, Oral, Q6H PRN, Jamey Fernandes MD, 50 mg at 04/29/22 1651    phenazopyridine (PYRIDIUM) tab 95 mg, 95 mg, Oral, TID PRN, Cee Queen MD, 95 mg at 04/29/22 6614    No current outpatient medications      Signed By: Jairon Doss     April 29, 2022

## 2022-04-29 NOTE — PROGRESS NOTES
Problem: Pain  Goal: *Control of Pain  Outcome: Progressing Towards Goal     Problem: Falls - Risk of  Goal: *Absence of Falls  Description: Document Balta Fall Risk and appropriate interventions in the flowsheet.   Outcome: Progressing Towards Goal  Note: Fall Risk Interventions:            Medication Interventions: Bed/chair exit alarm,Patient to call before getting OOB

## 2022-04-30 ENCOUNTER — HOSPITAL ENCOUNTER (EMERGENCY)
Age: 57
Discharge: HOME | End: 2022-04-30
Attending: STUDENT IN AN ORGANIZED HEALTH CARE EDUCATION/TRAINING PROGRAM
Payer: COMMERCIAL

## 2022-04-30 ENCOUNTER — APPOINTMENT (OUTPATIENT)
Dept: CT IMAGING | Age: 57
End: 2022-04-30
Attending: STUDENT IN AN ORGANIZED HEALTH CARE EDUCATION/TRAINING PROGRAM
Payer: COMMERCIAL

## 2022-04-30 VITALS
TEMPERATURE: 98.1 F | RESPIRATION RATE: 16 BRPM | HEIGHT: 70 IN | OXYGEN SATURATION: 98 % | DIASTOLIC BLOOD PRESSURE: 80 MMHG | BODY MASS INDEX: 29.35 KG/M2 | SYSTOLIC BLOOD PRESSURE: 136 MMHG | HEART RATE: 67 BPM | WEIGHT: 205 LBS

## 2022-04-30 DIAGNOSIS — N39.0 URINARY TRACT INFECTION WITHOUT HEMATURIA, SITE UNSPECIFIED: ICD-10-CM

## 2022-04-30 DIAGNOSIS — R10.9 FLANK PAIN: Primary | ICD-10-CM

## 2022-04-30 LAB
APPEARANCE UR: CLEAR
BACTERIA SPEC CULT: NORMAL
BACTERIA URNS QL MICRO: NEGATIVE /HPF
BILIRUB UR QL: NEGATIVE
COLOR UR: ABNORMAL
GLUCOSE UR STRIP.AUTO-MCNC: NEGATIVE MG/DL
HGB UR QL STRIP: ABNORMAL
KETONES UR QL STRIP.AUTO: NEGATIVE MG/DL
LEUKOCYTE ESTERASE UR QL STRIP.AUTO: ABNORMAL
MUCOUS THREADS URNS QL MICRO: ABNORMAL /LPF
NITRITE UR QL STRIP.AUTO: POSITIVE
PH UR STRIP: 6 [PH] (ref 5–8)
PROT UR STRIP-MCNC: 30 MG/DL
RBC #/AREA URNS HPF: >100 /HPF (ref 0–5)
SP GR UR REFRACTOMETRY: 1.01 (ref 1–1.03)
SPECIAL REQUESTS,SREQ: NORMAL
UA: UC IF INDICATED,UAUC: ABNORMAL
UROBILINOGEN UR QL STRIP.AUTO: 4 EU/DL (ref 0.1–1)
WBC URNS QL MICRO: ABNORMAL /HPF (ref 0–4)

## 2022-04-30 PROCEDURE — 74011250636 HC RX REV CODE- 250/636: Performed by: STUDENT IN AN ORGANIZED HEALTH CARE EDUCATION/TRAINING PROGRAM

## 2022-04-30 PROCEDURE — 99284 EMERGENCY DEPT VISIT MOD MDM: CPT

## 2022-04-30 PROCEDURE — 74011250637 HC RX REV CODE- 250/637: Performed by: STUDENT IN AN ORGANIZED HEALTH CARE EDUCATION/TRAINING PROGRAM

## 2022-04-30 PROCEDURE — 96372 THER/PROPH/DIAG INJ SC/IM: CPT

## 2022-04-30 PROCEDURE — 87086 URINE CULTURE/COLONY COUNT: CPT

## 2022-04-30 PROCEDURE — 81001 URINALYSIS AUTO W/SCOPE: CPT

## 2022-04-30 PROCEDURE — 74176 CT ABD & PELVIS W/O CONTRAST: CPT

## 2022-04-30 RX ORDER — OXYCODONE AND ACETAMINOPHEN 5; 325 MG/1; MG/1
1 TABLET ORAL
Status: DISCONTINUED | OUTPATIENT
Start: 2022-04-30 | End: 2022-04-30

## 2022-04-30 RX ORDER — KETOROLAC TROMETHAMINE 30 MG/ML
15 INJECTION, SOLUTION INTRAMUSCULAR; INTRAVENOUS
Status: COMPLETED | OUTPATIENT
Start: 2022-04-30 | End: 2022-04-30

## 2022-04-30 RX ORDER — ACETAMINOPHEN 500 MG
1000 TABLET ORAL
Status: COMPLETED | OUTPATIENT
Start: 2022-04-30 | End: 2022-04-30

## 2022-04-30 RX ADMIN — ACETAMINOPHEN 1000 MG: 500 TABLET ORAL at 23:06

## 2022-04-30 RX ADMIN — KETOROLAC TROMETHAMINE 15 MG: 30 INJECTION, SOLUTION INTRAMUSCULAR; INTRAVENOUS at 23:08

## 2022-05-01 NOTE — ED NOTES
First contact w/ pt- pt in NAD, resting on cart, family member at bedside. Urinal within reach, pt aware of need for sample.  Plan for UA and CT

## 2022-05-01 NOTE — ED TRIAGE NOTES
Pt had laser ureteroscopy on Thursday for large kidney stone removal with stent placement. D/c on Friday with tramadol, pt returns after calling urologist, Dr. Altaf Hutchins, about suprapubic pain and painful urination who is concerned for stent displacement and instructed pt to get imaging done in ED.

## 2022-05-01 NOTE — DISCHARGE INSTRUCTIONS
Thank you! Thank you for allowing me to care for you in the emergency department. I sincerely hope that you are satisfied with your visit today. It is my goal to provide you with excellent care. Below you will find a list of your labs and imaging from your visit today. Should you have any questions regarding these results please do not hesitate to call the emergency department. Labs -     Recent Results (from the past 12 hour(s))   URINALYSIS W/ REFLEX CULTURE    Collection Time: 04/30/22 10:05 PM    Specimen: Urine   Result Value Ref Range    Color Lynne      Appearance Clear Clear      Specific gravity 1.014 1.003 - 1.030      pH (UA) 6.0 5.0 - 8.0      Protein 30 (A) Negative mg/dL    Glucose Negative Negative mg/dL    Ketone Negative Negative mg/dL    Bilirubin Negative Negative      Blood Large (A) Negative      Urobilinogen 4.0 (H) 0.1 - 1.0 EU/dL    Nitrites Positive (A) Negative      Leukocyte Esterase Moderate (A) Negative      UA:UC IF INDICATED Urine Culture Ordered (A) Culture not indicated by UA result      WBC 10-20 0 - 4 /hpf    RBC >100 (H) 0 - 5 /hpf    Bacteria Negative Negative /hpf    Mucus Trace /lpf       Radiologic Studies -   CT ABD PELV WO CONT   Final Result   Satisfactorily positioned left ureteral stent with decompression of   the collecting system        CT Results  (Last 48 hours)                 04/30/22 2145  CT ABD PELV WO CONT Final result    Impression:  Satisfactorily positioned left ureteral stent with decompression of   the collecting system       Narrative:  CT dose reduction was achieved through use of a standardized protocol tailored   for this examination and automatic exposure control for dose modulation.        Noncontrast study shows mild atelectasis in the lung bases and small effusions       Small liver cyst. Spleen, pancreas, contracted gallbladder, adrenals are normal.   Tiny stone right kidney       Left ureteral stent has been placed since study of 2 days ago, acceptably   positioned and there is no longer hydronephrosis. [place] remains in the   midportion of the kidney. There is no ureter stone. No small bowel vascular abnormality, lymphadenopathy or free fluid       Mild prostatic enlargement. Mild diffuse bladder wall thickening. Normal colon   with minimal constipation. Fat stranding along the left iliac fossa is increased   but attributed to previous obstruction. This extends to the inguinal canal.       No significant bone finding                 CXR Results  (Last 48 hours)      None               If you feel that you have not received excellent quality care or timely care, please ask to speak to the nurse manager. Please choose us in the future for your continued health care needs. ------------------------------------------------------------------------------------------------------------  The exam and treatment you received in the Emergency Department were for an urgent problem and are not intended as complete care. It is important that you follow-up with a doctor, nurse practitioner, or physician assistant to:  (1) confirm your diagnosis,  (2) re-evaluation of changes in your illness and treatment, and  (3) for ongoing care. If your symptoms become worse or you do not improve as expected and you are unable to reach your usual health care provider, you should return to the Emergency Department. We are available 24 hours a day. Please take your discharge instructions with you when you go to your follow-up appointment. If you have any problem arranging a follow-up appointment, contact the Emergency Department immediately. If a prescription has been provided, please have it filled as soon as possible to prevent a delay in treatment. Read the entire medication instruction sheet provided to you by the pharmacy.  If you have any questions or reservations about taking the medication due to side effects or interactions with other medications, please call your primary care physician or contact the ER to speak with the charge nurse. Make an appointment with your family doctor or the physician you were referred to for follow-up of this visit as instructed on your discharge paperwork, as this is a mandatory follow-up. Return to the ER if you are unable to be seen or if you are unable to be seen in a timely manner. If you have any problem arranging the follow-up visit, contact the Emergency Department immediately.

## 2022-05-01 NOTE — ED PROVIDER NOTES
[id number]  EMERGENCY DEPARTMENT ENCOUNTER NOTE    Date: 4/30/2022  Patient Name: Laci Amaya. History of Presenting Illness     Chief Complaint   Patient presents with    Urinary Pain       History Provided By: Patient    HPI: Laci Amaya., 64 y.o. male with PMH of nephrolithiasis with recent placement of a stent presents to the ED with acute onset of pain. He was admitted recently for obstructive nephrolithiasis and now has recurrence of pain in the left flank, nothing specific makes it better or worse and seems to be somewhat triggered by when he is micturating and after micturating his pain with lasts longer than before. He is denying any fever or chills. Reports that he still able to tolerate oral intake with no difficulties. When he was discharged he was given antibiotics and pain medication but now with pain medication has not been helping with his pain. He did call his urologist who instructed him to come to the ED for further evaluation. There are no other complaints, changes, or physical findings at this time. PCP: None    Current Outpatient Medications   Medication Sig Dispense Refill    phenazopyridine (PYRIDIUM) 95 mg tab Take 1 Tablet by mouth three (3) times daily as needed for Pain. 10 Tablet 0    traMADoL (ULTRAM) 50 mg tablet Take 1 Tablet by mouth every six (6) hours as needed for Pain for up to 3 days. Max Daily Amount: 200 mg. 10 Tablet 0    ciprofloxacin HCl (Cipro) 500 mg tablet Take 1 Tablet by mouth two (2) times a day. 10 Tablet 0       Past History     Past Medical History:  Past Medical History:   Diagnosis Date    Kidney calculi        Past Surgical History:  No past surgical history on file. Family History:  No family history on file.     Social History:  Social History     Tobacco Use    Smoking status: Never Smoker    Smokeless tobacco: Never Used   Substance Use Topics    Alcohol use: Not Currently    Drug use: Never       Allergies:  No Known Allergies      Review of Systems     Review of Systems    A 10 point review of system was performed and was negative except as noted above HPI    Physical Exam     Physical Exam  Vitals and nursing note reviewed. Constitutional:       General: He is not in acute distress. Appearance: He is not ill-appearing, toxic-appearing or diaphoretic. HENT:      Head: Normocephalic and atraumatic. Mouth/Throat:      Mouth: Mucous membranes are moist.      Pharynx: Oropharynx is clear. Eyes:      Extraocular Movements: Extraocular movements intact. Pupils: Pupils are equal, round, and reactive to light. Cardiovascular:      Rate and Rhythm: Normal rate and regular rhythm. Pulmonary:      Effort: Pulmonary effort is normal.      Breath sounds: Normal breath sounds. Abdominal:      Palpations: Abdomen is soft. Tenderness: There is no abdominal tenderness. There is left CVA tenderness. Skin:     General: Skin is warm and dry. Neurological:      Mental Status: He is alert and oriented to person, place, and time.          Lab and Diagnostic Study Results     Labs -     Recent Results (from the past 12 hour(s))   URINALYSIS W/ REFLEX CULTURE    Collection Time: 04/30/22 10:05 PM    Specimen: Urine   Result Value Ref Range    Color Lynne      Appearance Clear Clear      Specific gravity 1.014 1.003 - 1.030      pH (UA) 6.0 5.0 - 8.0      Protein 30 (A) Negative mg/dL    Glucose Negative Negative mg/dL    Ketone Negative Negative mg/dL    Bilirubin Negative Negative      Blood Large (A) Negative      Urobilinogen 4.0 (H) 0.1 - 1.0 EU/dL    Nitrites Positive (A) Negative      Leukocyte Esterase Moderate (A) Negative      UA:UC IF INDICATED Urine Culture Ordered (A) Culture not indicated by UA result      WBC 10-20 0 - 4 /hpf    RBC >100 (H) 0 - 5 /hpf    Bacteria Negative Negative /hpf    Mucus Trace /lpf       Radiologic Studies -   [unfilled]  CT Results  (Last 48 hours)               04/30/22 2145  CT ABD PELV WO CONT Final result    Impression:  Satisfactorily positioned left ureteral stent with decompression of   the collecting system       Narrative:  CT dose reduction was achieved through use of a standardized protocol tailored   for this examination and automatic exposure control for dose modulation. Noncontrast study shows mild atelectasis in the lung bases and small effusions       Small liver cyst. Spleen, pancreas, contracted gallbladder, adrenals are normal.   Tiny stone right kidney       Left ureteral stent has been placed since study of 2 days ago, acceptably   positioned and there is no longer hydronephrosis. [place] remains in the   midportion of the kidney. There is no ureter stone. No small bowel vascular abnormality, lymphadenopathy or free fluid       Mild prostatic enlargement. Mild diffuse bladder wall thickening. Normal colon   with minimal constipation. Fat stranding along the left iliac fossa is increased   but attributed to previous obstruction. This extends to the inguinal canal.       No significant bone finding               CXR Results  (Last 48 hours)    None          Medical Decision Making and ED Course   - I am the first and primary provider for this patient AND AM THE PRIMARY PROVIDER OF RECORD. - I reviewed the vital signs, available nursing notes, past medical history, past surgical history, family history and social history. - Initial assessment performed. The patients presenting problems have been discussed, and the staff are in agreement with the care plan formulated and outlined with them. I have encouraged them to ask questions as they arise throughout their visit. Vital Signs-Reviewed the patient's vital signs.     Patient Vitals for the past 24 hrs:   Temp Pulse Resp BP SpO2   04/30/22 2323 -- 67 16 136/80 98 %   04/30/22 2103 98.1 °F (36.7 °C) 65 16 139/84 97 %       Records Reviewed: Nursing Notes and Old Medical Records    Provider Notes (Medical Decision Making):     Patient presented to the ED with onset of flank pain. He did have nephrolithiasis that was removed and had placement of stent. Concern for stent misplacement. His pain also could be secondary to a UTI that he was recently diagnosed with in the setting of obstructing stone. I did obtain imaging of his belly which showed proper positioning of the stent. Urinalysis confirms UTI. I did check the urine culture that has been sent a few days ago and did not show any specific growth. I think is appropriately covered with ciprofloxacin. I did resend another blood culture and patient will follow-up with his urologist on Monday morning for reevaluation. Otherwise, he appears clinically well and is not requiring administration of intravenous narcotics. Symptomatic control with Tylenol and NSAIDs. Anticipatory guidance return precautions discussed with the patient and his significant other who was accompanying him. At time discharge, patient appears clinically well, hemoglobin is stable, able to ambulate and tolerated oral intake. Disposition     Disposition: Condition improved  DC- Adult Discharges: All of the diagnostic tests were reviewed and questions answered. Diagnosis, care plan and treatment options were discussed. The patient understands the instructions and will follow up as directed. The patients results have been reviewed with them. They have been counseled regarding their diagnosis. The patient verbally convey understanding and agreement of the signs, symptoms, diagnosis, treatment and prognosis and additionally agrees to follow up as recommended with their PCP in 24 - 48 hours. They also agree with the care-plan and convey that all of their questions have been answered.   I have also put together some discharge instructions for them that include: 1) educational information regarding their diagnosis, 2) how to care for their diagnosis at home, as well a 3) list of reasons why they would want to return to the ED prior to their follow-up appointment, should their condition change. Discharged      DISCHARGE PLAN:  1. Current Discharge Medication List      CONTINUE these medications which have NOT CHANGED    Details   phenazopyridine (PYRIDIUM) 95 mg tab Take 1 Tablet by mouth three (3) times daily as needed for Pain. Qty: 10 Tablet, Refills: 0      traMADoL (ULTRAM) 50 mg tablet Take 1 Tablet by mouth every six (6) hours as needed for Pain for up to 3 days. Max Daily Amount: 200 mg. Qty: 10 Tablet, Refills: 0    Associated Diagnoses: Kidney stone      ciprofloxacin HCl (Cipro) 500 mg tablet Take 1 Tablet by mouth two (2) times a day. Qty: 10 Tablet, Refills: 0           2. Follow-up Information     Follow up With Specialties Details Why [address] EMERGENCY DEPT Emergency Medicine Go to  For reevaluation, Discuss your visit to the ER [address]  Darrold Plain, MD Urology Go on 5/2/2022 For reevaluation, Discuss your visit to the ER [address] [place] [address]  [phone]          3. Return to ED if worse   4. Discharge Medication List as of 4/30/2022 11:11 PM          Diagnosis     Clinical Impression:   1. Flank pain    2. Urinary tract infection without hematuria, site unspecified        Attestations: Erickson Skaggs MD    Please note that this dictation was completed with [employer], the computer voice recognition software. Quite often unanticipated grammatical, syntax, homophones, and other interpretive errors are inadvertently transcribed by the computer software. Please disregard these errors. Please excuse any errors that have escaped final proofreading. Thank you.

## 2022-05-02 ENCOUNTER — HOSPITAL ENCOUNTER (OUTPATIENT)
Dept: GENERAL RADIOLOGY | Age: 57
Discharge: HOME | End: 2022-05-02
Payer: COMMERCIAL

## 2022-05-02 ENCOUNTER — OFFICE VISIT (OUTPATIENT)
Dept: UROLOGY | Age: 57
End: 2022-05-02
Payer: COMMERCIAL

## 2022-05-02 VITALS
OXYGEN SATURATION: 98 % | TEMPERATURE: 97 F | HEART RATE: 81 BPM | RESPIRATION RATE: 16 BRPM | SYSTOLIC BLOOD PRESSURE: 125 MMHG | DIASTOLIC BLOOD PRESSURE: 72 MMHG

## 2022-05-02 DIAGNOSIS — N20.0 KIDNEY STONES: Primary | ICD-10-CM

## 2022-05-02 DIAGNOSIS — N20.0 KIDNEY STONES: ICD-10-CM

## 2022-05-02 DIAGNOSIS — Z96.0 RETAINED URETERAL STENT: ICD-10-CM

## 2022-05-02 DIAGNOSIS — T83.84XA PAIN DUE TO URETERAL STENT, INITIAL ENCOUNTER (HCC): ICD-10-CM

## 2022-05-02 LAB
BACTERIA SPEC CULT: NORMAL
BILIRUB UR QL: NEGATIVE
GLUCOSE UR-MCNC: NEGATIVE MG/DL
KETONES P FAST UR STRIP-MCNC: NEGATIVE MG/DL
PH UR STRIP: 7.5 [PH] (ref 4.6–8)
PROT UR QL STRIP: NEGATIVE
SP GR UR STRIP: 1.01 (ref 1–1.03)
SPECIAL REQUESTS,SREQ: NORMAL
UA UROBILINOGEN AMB POC: NORMAL (ref 0.2–1)
URINALYSIS CLARITY POC: CLEAR
URINALYSIS COLOR POC: YELLOW
URINE BLOOD POC: NORMAL
URINE LEUKOCYTES POC: NORMAL
URINE NITRITES POC: NEGATIVE

## 2022-05-02 PROCEDURE — 99214 OFFICE O/P EST MOD 30 MIN: CPT | Performed by: UROLOGY

## 2022-05-02 PROCEDURE — 52310 CYSTOSCOPY AND TREATMENT: CPT | Performed by: UROLOGY

## 2022-05-02 PROCEDURE — 74018 RADEX ABDOMEN 1 VIEW: CPT

## 2022-05-02 PROCEDURE — 81003 URINALYSIS AUTO W/O SCOPE: CPT | Performed by: UROLOGY

## 2022-05-02 RX ORDER — CIPROFLOXACIN 500 MG/1
500 TABLET ORAL ONCE
Status: COMPLETED | OUTPATIENT
Start: 2022-05-02 | End: 2022-05-02

## 2022-05-02 RX ADMIN — CIPROFLOXACIN 500 MG: 500 TABLET ORAL at 14:00

## 2022-05-02 NOTE — PROGRESS NOTES
HISTORY OF PRESENT ILLNESS  Madisyn Alamo is a 64 y.o. male. 4/28/22  Ureteroscopy nephroscopy laser to stones that ureteropelvic junction removal stone fragments diagnostic fluoroscopy cystoscopy and double-J stent placed    On 4/30/22 presents to the ED with acute onset of pain. He was admitted recently for obstructive nephrolithiasis and now has recurrence of pain in the left flank, nothing specific makes it better or worse and seems to be somewhat triggered by when he is micturating and after micturating his pain with lasts longer than before. Ct scan on 4/30/22  Left ureteral stent has been placed since study of 2 days ago, acceptably  positioned and there is no longer hydronephrosis. Laurelyn Massy remains in the  midportion of the kidney. There is no ureter stone. Ct scan 4/28/22  IMPRESSION  1. 6.5 mm obstructing stone left UPJ. Left perinephric inflammation. Correlate  for medical renal disease to include pyelonephritis. 2. Nonobstructing bilateral nephrolithiasis. Patient appears to still have a calcification in his kidney. This was seen on a CAT scan. His main complaints is stent pain. He presently denies fevers chills nausea and vomiting       Past Medical History:  PMHx (including negatives):  has a past medical history of Kidney calculi. PSurgHx:  has a past surgical history that includes hx urological (05/02/2022) and hx urological (04/28/2022). PSocHx:  reports that he has never smoked. He has never used smokeless tobacco. He reports previous alcohol use. He reports that he does not use drugs. I sent him for a KUB and I could not see the stone specifically, lot of gas is still on the film. We talked about watching and waiting repeating a KUB in 1 month make a decision about lithotripsy a etc.  If he has more pain he will return sooner  Chronic Conditions Addressed Today     1.  Kidney stones - Primary     Relevant Medications     ciprofloxacin HCl (CIPRO) tablet 500 mg (Completed) Other Relevant Orders     AMB POC URINALYSIS DIP STICK AUTO W/O MICRO (Completed)     XR ABD (KUB)     XR ABD (KUB)    2. Pain due to ureteral stent ([address]    3. Retained ureteral stent          Review of Systems   Constitutional: Negative. HENT: Negative. Eyes: Negative. Respiratory: Negative. Cardiovascular: Negative. Gastrointestinal: Negative. Genitourinary: Positive for frequency and urgency. Musculoskeletal: Negative. Skin: Negative. Neurological: Negative. Endo/Heme/Allergies: Negative. Psychiatric/Behavioral: Negative. Patient denies the symptoms of COVID-19 per routine screening guidelines. Home Medications    Medication Sig Start Date End Date Taking? Authorizing Provider   phenazopyridine (PYRIDIUM) 95 mg tab Take 1 Tablet by mouth three (3) times daily as needed for Pain. 4/29/22  Yes Myles Fernandes MD   traMADoL (ULTRAM) 50 mg tablet Take 1 Tablet by mouth every six (6) hours as needed for Pain for up to 3 days. Max Daily Amount: 200 mg. 4/29/22 5/2/22 Yes Myles Fernandes MD   ciprofloxacin HCl (Cipro) 500 mg tablet Take 1 Tablet by mouth two (2) times a day. 4/29/22  Yes Delfina Amin MD      Physical Exam  Physical Exam:   Physical Exam  HENT:      Head: Normocephalic and atraumatic. Cardiovascular:      Rate and Rhythm: Normal rate and regular rhythm. Pulmonary:      Effort: Pulmonary effort is normal.      Breath sounds: Normal breath sounds. Abdominal:      Palpations: Abdomen is soft. Tenderness: There is left CVA tenderness. Skin:     General: Skin is warm and dry. Neurological:      General: No focal deficit present. Mental Status: He is alert and oriented to person, place, and time.       ASSESSMENT and PLAN  Diagnoses and all orders for this visit:    1. Kidney stones  -     AMB POC URINALYSIS DIP STICK AUTO W/O MICRO  -     ciprofloxacin HCl (CIPRO) tablet 500 mg  -     XR ABD (KUB); Future  -     XR ABD (KUB); Future    2. Retained ureteral stent    3. Pain due to ureteral stent, initial encounter ([facility] [address]                 Madisyn Broderick. may have a reminder for a \"due or due soon\" health maintenance. The patient has been encouraged to contact their primary care provider for follow-up on this health maintenance or other necessary and/or routine health screening.      Jesusita Mcmillan MD

## 2022-05-02 NOTE — LETTER
NOTIFICATION RETURN TO WORK / SCHOOL    5/2/2022 2:49 PM    Mr. Pandya Gifty [id number]      To Whom It May Concern: Betty Tabares. is currently under the care of JERRY Harrington. He will return to work/school on: Wednesday May 4th 2022    If there are questions or concerns please have the patient contact our office.         Sincerely,      Robinson Ramirez MD

## 2022-05-02 NOTE — PROGRESS NOTES
Chief Complaint   Patient presents with    Cystoscopy     with stent removal    Kidney Stone       PHQ-9 score is    Negative    Vitals:    05/02/22 1318   BP: 125/72   Pulse: 81   Resp: 16   Temp: 97 °F (36.1 °C)   TempSrc: Temporal   SpO2: 98%      1. \"Have you been to the ER, urgent care clinic since your last visit? Hospitalized since your last visit? \" No    2. \"Have you seen or consulted any other health care providers outside of the [address] [place] [address] since your last visit? \" No     3. For patients aged 39-70: Has the patient had a colonoscopy / FIT/ Cologuard? No      If the patient is female:    4. For patients aged 41-77: Has the patient had a mammogram within the past 2 years? No      5. For patients aged 21-65: Has the patient had a pap smear?  NA - based on age or sex

## 2022-05-02 NOTE — PROGRESS NOTES
cystoscopy with stent removal  Pre-op diagnosis- retained ureteral stent, significant stent pain  Postop diagnosis same  Surgeon-Darryn  Anesthesia-local  Complication-without  EBL-none  Assistant-none  Indication-patient has severe pain we will set him up for stent removal.  He is aware the risk of bleeding infection that the stone in his kidney could fall down to block his kidney. Also the risk of injuring the ureter he has no questions  Procedure-patient was prepped and draped in usual sterile fashion and placed in the supine position. Received Xylocaine per urethra. Preoperative antibiotics. Sinus consent. He was scope with a flexible cystoscope. Had normal pendulous bulbous membranous urethra. Medium size prostate urethra nothing will note some trilobar hypertrophy. Once the bladder 3+ trabeculated bladder no stones or tumors appreciated. Stent was projecting to the left ureteral orifice. This was grabbed a stent grasper and gently removed without difficulty.   Patient was taken off the table cover area without complication

## 2022-05-04 ENCOUNTER — TELEPHONE (OUTPATIENT)
Dept: UROLOGY | Age: 57
End: 2022-05-04

## 2022-05-04 NOTE — TELEPHONE ENCOUNTER
pts wife called stating patient Return to work note needs to be revised to say his return date and that he can work t his normal/full capacity without any restrictions/limitations     And if it can be faxed to [address] [place] [address] at

## 2022-05-05 LAB
AMM URATE MFR STONE: NORMAL %
BILIRUBIN, STO37L: NORMAL
BLD.DRIED MFR STONE: NORMAL %
CA BILIRUB MFR STONE: NORMAL %
CA CARBONATE MFR STONE: NORMAL %
CA H2 PHOS DIHYD MFR STONE: NORMAL %
CA PHOS MFR STONE: NORMAL %
CALCIUM OXALATE DIHYDRATE MFR STONE IR: NORMAL %
CALCIUM PALMITATE, STO38L: NORMAL
CALCIUM STEARATE, STO39L: NORMAL
CARBONATE APATITE: NORMAL %
CELL MATERIAL STONE EST-MCNT: NORMAL %
CHOLEST MFR STONE: NORMAL %
COLOR STONE: NORMAL
COM MFR STONE: 100 %
COMMENT, 120677: NORMAL
COMMENT, 519994: NORMAL
COMPOSITION, 120440: NORMAL
CYSTINE MFR STONE: NORMAL %
DISCLAIMER, STO32L: NORMAL
DRUG OR METABOLITE, STO40L: NORMAL
HYDROXYAPATITE: NORMAL %
NA URATE MFR STONE IR: NORMAL %
NEWBERYITE MFR STONE: NORMAL %
NIDUS STONE QL: NORMAL
OTHER COMPONENTS, STO41L: NORMAL
PHOTO, 120675: NORMAL
PLEASE NOTE, 130422: NORMAL
SHELL, 120438: NORMAL
SIZE STONE: NORMAL MM
SPECIMEN SOURCE: NORMAL
STO35L, 2,8-DIHYDROXYADENINE: NORMAL
STONE ANALYSIS-IMP: NORMAL
SURFACE CRYSTALS, 120439: NORMAL
TRI-PHOS MFR STONE: NORMAL %
TRIAMTERENE MFR STONE: NORMAL %
URATE DIHYD MFR STONE: NORMAL %
URATE MFR STONE: NORMAL %
WT STONE: 47 MG
XANTHINE, STO36L: NORMAL

## 2022-05-24 ENCOUNTER — TELEPHONE (OUTPATIENT)
Dept: UROLOGY | Age: 57
End: 2022-05-24

## 2022-05-24 DIAGNOSIS — Z96.0 RETAINED URETERAL STENT: ICD-10-CM

## 2022-05-24 DIAGNOSIS — N20.0 KIDNEY STONE: Primary | ICD-10-CM

## 2022-05-24 NOTE — TELEPHONE ENCOUNTER
OD 7/10/18 SEEMS CYSTOID ME, COULD BE DUE TO LATANOPROST, WILL RECHECK. Spoke to spouse, reminded of pt's KUB prior to OV

## 2022-05-31 ENCOUNTER — HOSPITAL ENCOUNTER (OUTPATIENT)
Dept: GENERAL RADIOLOGY | Age: 57
Discharge: HOME | End: 2022-05-31
Payer: COMMERCIAL

## 2022-05-31 DIAGNOSIS — Z96.0 RETAINED URETERAL STENT: ICD-10-CM

## 2022-05-31 DIAGNOSIS — N20.0 KIDNEY STONE: ICD-10-CM

## 2022-05-31 PROCEDURE — 74018 RADEX ABDOMEN 1 VIEW: CPT

## 2022-06-10 NOTE — PROGRESS NOTES
HISTORY OF PRESENT ILLNESS  Phoenix Self is a 64 y.o. male is here for follow up on his kidney stones  He is s/p on 5/2/22 stent removal  He is s/p on 4/28/22  Ureteroscopy nephroscopy laser to stones that ureteropelvic junction removal stone fragments diagnostic fluoroscopy cystoscopy and double-J stent placed  Stone analysis: Ca oxalate monohydrate 100%  Patient did develop flank bruise after ESWL  KUB from 5/31/22: Findings: A 3 mm density at the left renal fossa is consistent with a calculus. Other bilateral renal calculi on CT abdomen pelvis 4/30/2022 are not well  visualized.     Ct from 4/28/22  IMPRESSION  1. 6.5 mm obstructing stone left UPJ. Left perinephric inflammation. Correlate  for medical renal disease to include pyelonephritis. 2. Nonobstructing bilateral nephrolithiasis. Today he denies any N/V, flank pain , gross hematuria, fever or chills, frequency or urgency. His IPSS score is 3 patient did have a flank bruise after ESWL. Presently is asymptomatic with a small stone in his right kidney. We talked about ESWL to the stone but this point there is no real indication is not getting bigger or not causing pain and will pass on its own if it passes       Past Medical History:  PMHx (including negatives):  has a past medical history of Kidney calculi. PSurgHx:  has a past surgical history that includes hx urological (05/02/2022); hx urological (04/28/2022); hx urological (04/28/2022); hx urological (04/28/2022); and hx urological (04/28/2022). PSocHx:  reports that he has never smoked. He has never used smokeless tobacco. He reports previous alcohol use. He reports that he does not use drugs. Chronic Conditions Addressed Today     1.  Right renal stone - Primary     Overview      5/2/22- cystoscopy and stent removal  4/28/22  Ureteroscopy nephroscopy laser to stones that ureteropelvic junction removal stone fragments diagnostic fluoroscopy cystoscopy and double-J stent placed     On 4/30/22 presents to the ED with acute onset of pain. [facility] was admitted recently for obstructive nephrolithiasis and now has recurrence of pain in the left flank, nothing specific makes it better or worse and seems to be somewhat triggered by when he is micturating and after micturating his pain with lasts longer than before.     Ct scan on 4/30/22  Left ureteral stent has been placed since study of 2 days ago, acceptably  positioned and there is no longer hydronephrosis. Emmette Poplar remains in the  midportion of the kidney. There is no ureter stone. Ct scan 4/28/22  IMPRESSION  1. 6.5 mm obstructing stone left UPJ. Left perinephric inflammation. Correlate  for medical renal disease to include pyelonephritis. 2. Nonobstructing bilateral nephrolithiasis. Patient appears to still have a calcification in his kidney. This was seen on a CAT scan. His main complaints is stent pain. He presently denies fevers chills nausea and vomiting         2. Left ureteral stone          Review of Systems   Constitutional: Negative. HENT: Negative. Eyes: Negative. Respiratory: Negative. Cardiovascular: Negative. Gastrointestinal: Negative. Genitourinary: Negative. Musculoskeletal: Negative. Skin: Negative. Neurological: Negative. Psychiatric/Behavioral: Negative. Patient denies the symptoms of COVID-19 per routine screening guidelines. Home Medications    Not on File      Physical Exam  HENT:      Head: Normocephalic and atraumatic. Cardiovascular:      Rate and Rhythm: Normal rate and regular rhythm. Pulmonary:      Effort: Pulmonary effort is normal.      Breath sounds: Normal breath sounds. Abdominal:      Palpations: Abdomen is soft.      Tenderness: There is  no tenderness. Skin:     General: Skin is warm and dry.    Neurological:      General: No focal deficit present.      Mental Status: He is alert and oriented to person, place, and time.       ASSESSMENT and PLAN  Diagnoses and all orders for this visit:    1. Right renal stone    2. Kidney stones  -     AMB POC URINALYSIS DIP STICK AUTO W/O MICRO  -     XR ABD (KUB); Future    3. Left ureteral stone         Return in 6 months KUB prior. Fluids take Advil for pain        Rachel Michelle. may have a reminder for a \"due or due soon\" health maintenance. The patient has been encouraged to contact their primary care provider for follow-up on this health maintenance or other necessary and/or routine health screening.      Merlene Thomas MD

## 2022-06-13 ENCOUNTER — OFFICE VISIT (OUTPATIENT)
Dept: UROLOGY | Age: 57
End: 2022-06-13
Payer: COMMERCIAL

## 2022-06-13 VITALS
DIASTOLIC BLOOD PRESSURE: 62 MMHG | WEIGHT: 205 LBS | HEIGHT: 70 IN | SYSTOLIC BLOOD PRESSURE: 114 MMHG | RESPIRATION RATE: 16 BRPM | HEART RATE: 78 BPM | TEMPERATURE: 98.8 F | OXYGEN SATURATION: 97 % | BODY MASS INDEX: 29.35 KG/M2

## 2022-06-13 DIAGNOSIS — N20.0 KIDNEY STONES: ICD-10-CM

## 2022-06-13 DIAGNOSIS — N20.0 RIGHT RENAL STONE: Primary | ICD-10-CM

## 2022-06-13 DIAGNOSIS — N20.1 LEFT URETERAL STONE: ICD-10-CM

## 2022-06-13 PROCEDURE — 81003 URINALYSIS AUTO W/O SCOPE: CPT | Performed by: UROLOGY

## 2022-06-13 PROCEDURE — 99213 OFFICE O/P EST LOW 20 MIN: CPT | Performed by: UROLOGY

## 2022-06-13 NOTE — PROGRESS NOTES
Chief Complaint   Patient presents with    Follow-up     ROS IPSS    Kidney Stone     KUB 5/31/22       PHQ-9 score is    Negative    Vitals:    06/13/22 1452   BP: 114/62   Pulse: 78   Resp: 16   Temp: 98.8 °F (37.1 °C)   TempSrc: Temporal   SpO2: 97%   Weight: 205 lb (93 kg)   Height: 5' 10\" (1.778 m)      1. \"Have you been to the ER, urgent care clinic since your last visit? Hospitalized since your last visit? \" No    2. \"Have you seen or consulted any other health care providers outside of the [address] [place] [address] since your last visit? \" No     3. For patients aged 39-70: Has the patient had a colonoscopy / FIT/ Cologuard? Yes - no Care Gap present      If the patient is female:    4. For patients aged 41-77: Has the patient had a mammogram within the past 2 years? NA - based on age or sex      11. For patients aged 21-65: Has the patient had a pap smear?  NA - based on age or sex

## 2022-06-14 LAB
BILIRUB UR QL: NEGATIVE
GLUCOSE UR-MCNC: NEGATIVE MG/DL
KETONES P FAST UR STRIP-MCNC: NEGATIVE MG/DL
PH UR STRIP: 5 [PH] (ref 4.6–8)
PROT UR QL STRIP: NEGATIVE
SP GR UR STRIP: 1.03 (ref 1–1.03)
UA UROBILINOGEN AMB POC: NORMAL (ref 0.2–1)
URINALYSIS CLARITY POC: CLEAR
URINALYSIS COLOR POC: YELLOW
URINE BLOOD POC: NEGATIVE
URINE LEUKOCYTES POC: NORMAL
URINE NITRITES POC: NEGATIVE

## 2022-09-08 ENCOUNTER — HOSPITAL ENCOUNTER (EMERGENCY)
Age: 57
Discharge: HOME | End: 2022-09-08
Attending: STUDENT IN AN ORGANIZED HEALTH CARE EDUCATION/TRAINING PROGRAM
Payer: COMMERCIAL

## 2022-09-08 VITALS
DIASTOLIC BLOOD PRESSURE: 73 MMHG | WEIGHT: 200 LBS | OXYGEN SATURATION: 96 % | HEIGHT: 70 IN | TEMPERATURE: 97.8 F | BODY MASS INDEX: 28.63 KG/M2 | RESPIRATION RATE: 17 BRPM | SYSTOLIC BLOOD PRESSURE: 136 MMHG | HEART RATE: 69 BPM

## 2022-09-08 DIAGNOSIS — S05.02XA INJURY OF CONJUNCTIVA AND CORNEAL ABRASION WITHOUT FOREIGN BODY, LEFT EYE, INITIAL ENCOUNTER: Primary | ICD-10-CM

## 2022-09-08 PROCEDURE — 99283 EMERGENCY DEPT VISIT LOW MDM: CPT

## 2022-09-08 PROCEDURE — 74011000250 HC RX REV CODE- 250: Performed by: STUDENT IN AN ORGANIZED HEALTH CARE EDUCATION/TRAINING PROGRAM

## 2022-09-08 PROCEDURE — 74011250637 HC RX REV CODE- 250/637: Performed by: STUDENT IN AN ORGANIZED HEALTH CARE EDUCATION/TRAINING PROGRAM

## 2022-09-08 RX ORDER — ERYTHROMYCIN 5 MG/G
OINTMENT OPHTHALMIC
Qty: 1 G | Refills: 0 | Status: SHIPPED | OUTPATIENT
Start: 2022-09-08

## 2022-09-08 RX ORDER — TETRACAINE HYDROCHLORIDE 5 MG/ML
1 SOLUTION OPHTHALMIC
Status: COMPLETED | OUTPATIENT
Start: 2022-09-08 | End: 2022-09-08

## 2022-09-08 RX ORDER — ERYTHROMYCIN 5 MG/G
OINTMENT OPHTHALMIC
Status: COMPLETED | OUTPATIENT
Start: 2022-09-08 | End: 2022-09-08

## 2022-09-08 RX ADMIN — ERYTHROMYCIN: 5 OINTMENT OPHTHALMIC at 22:22

## 2022-09-08 RX ADMIN — FLUORESCEIN SODIUM 1 STRIP: 1 STRIP OPHTHALMIC at 22:00

## 2022-09-08 RX ADMIN — TETRACAINE HYDROCHLORIDE 1 DROP: 5 SOLUTION OPHTHALMIC at 22:05

## 2022-09-09 NOTE — ED TRIAGE NOTES
Pt reports eye swelling, tearing, and burning with redness x 3 days. Pt put visine drops in it tonight which made it swell up.

## 2022-09-09 NOTE — DISCHARGE INSTRUCTIONS
Please return to the emergency department if your symptoms worsen or you experience any new symptoms that are concerning to you. Please follow-up with Opthalmology within the next week.

## 2022-09-09 NOTE — ED PROVIDER NOTES
EMERGENCY DEPARTMENT HISTORY AND PHYSICAL EXAM      Date: 9/8/2022  Patient Name: Ana Martin. History of Presenting Illness     Chief Complaint   Patient presents with    Red Eye    Eye Swelling     left       History Provided By: Patient    HPI: Ana Goodrich, 62 y.o. male presenting for evaluation of left eye pain. Symptoms started 3 days ago without any specific preceding event. 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  He initially felt generalized discomfort with redness. No discharge from the eye. He attempted to treat his symptoms at home with Visine tonight which resulted in more significant pain and drainage from the eye. No associated upper respiratory symptoms. No fevers or chills. No change in his vision. NO lesions on the face. He does not wear glasses or contacts. There are no other complaints, changes, or physical findings at this time. PCP: None    No current facility-administered medications on file prior to encounter. No current outpatient medications on file prior to encounter.        Past History     Past Medical History:  Past Medical History:   Diagnosis Date    Kidney calculi     Kidney stones        Past Surgical History:  Past Surgical History:   Procedure Laterality Date    HX UROLOGICAL  05/02/2022    Cysto stent removal    HX UROLOGICAL  04/28/2022    Ureteroscopy nephroscopy laser to stones     HX UROLOGICAL  04/28/2022     ureteropelvic junction removal stone    HX UROLOGICAL  04/28/2022    fragments diagnostic fluoroscopy     HX UROLOGICAL  04/28/2022    cystoscopy and double-J stent placed       Family History:  Family History   Problem Relation Age of Onset    No Known Problems Mother     No Known Problems Father        Social History:  Social History     Tobacco Use    Smoking status: Never    Smokeless tobacco: Never   Vaping Use    Vaping Use: Never used   Substance Use Topics    Alcohol use: Not Currently    Drug use: Never Allergies:  No Known Allergies    Review of Systems   Review of Systems  Constitutional: Negative except as in HPI. Eyes: Negative except as in HPI.  ENT: Negative except as in HPI. Cardiovascular: Negative except as in HPI. Respiratory: Negative except as in HPI. Gastrointestinal: Negative except as in HPI. Genitourinary: Negative except as in HPI. Musculoskeletal: Negative except as in HPI. Integumentary: Negative except as in HPI. Neurological: Negative except as in HPI. Psychiatric: Negative except as in HPI. Endocrine: Negative except as in HPI. Hematologic/Lymphatic: Negative except as in HPI. Allergic/Immunologic: Negative except as in HPI. Physical Exam   Physical Exam  HENT:      Head: Normocephalic and atraumatic. Mouth/Throat:      Mouth: Mucous membranes are moist.   Eyes:      Extraocular Movements: Extraocular movements intact. Pupils: Pupils are equal, round, and reactive to light. Comments: Left-sided conjunctival injection without drainage. On fluorescein exam, possible ulcers seen lateral and overlapping the cornea to the left and right. Cardiovascular:      Rate and Rhythm: Normal rate and regular rhythm. Pulmonary:      Effort: Pulmonary effort is normal.   Abdominal:      General: Abdomen is flat. There is no distension. Musculoskeletal:         General: No deformity. Cervical back: Normal range of motion. Skin:     General: Skin is warm and dry. Findings: No lesion or rash. Neurological:      General: No focal deficit present. Mental Status: He is alert. Lab and Diagnostic Study Results   Labs -   No results found for this or any previous visit (from the past 12 hour(s)).     Radiologic Studies -   @lastxrresult@  CT Results  (Last 48 hours)      None          CXR Results  (Last 48 hours)      None            Medical Decision Making and ED Course   Differential Diagnosis & Medical Decision Making Provider Note:   59-year-old male presenting for evaluation of left eye pain and redness. Suspect prior foreign body leading to discomfort and symptoms worsened by intolerance of Visine tonight. Eyes flushed here. Possible ulcer seen on fluorescein exam and will treat with erythromycin. Patient to follow-up with ophthalmology. - I am the first provider for this patient. I reviewed the vital signs, available nursing notes, past medical history, past surgical history, family history and social history. The patients presenting problems have been discussed, and they are in agreement with the care plan formulated and outlined with them. I have encouraged them to ask questions as they arise throughout their visit. Vital Signs-Reviewed the patient's vital signs. Patient Vitals for the past 12 hrs:   Temp Pulse Resp BP SpO2   09/08/22 2038 97.8 °F (36.6 °C) 69 17 136/73 96 %       ED Course:          Procedures   Performed by: Evan Polanco MD  Procedures      Disposition   Disposition: DC- Adult Discharges: All of the diagnostic tests were reviewed and questions answered. Diagnosis, care plan and treatment options were discussed. The patient understands the instructions and will follow up as directed. The patients results have been reviewed with them. They have been counseled regarding their diagnosis. The patient verbally convey understanding and agreement of the signs, symptoms, diagnosis, treatment and prognosis and additionally agrees to follow up as recommended with their PCP in 24 - 48 hours. They also agree with the care-plan and convey that all of their questions have been answered. I have also put together some discharge instructions for them that include: 1) educational information regarding their diagnosis, 2) how to care for their diagnosis at home, as well a 3) list of reasons why they would want to return to the ED prior to their follow-up appointment, should their condition change. DISCHARGE PLAN:  1.  Cannot display discharge medications since this patient is not currently admitted. 2.   Follow-up Information       Follow up With Specialties Details Why Castillo6 Antonio Cardoza  In 1 week  [address] [id number]  [address] [phone]          3. Return to ED if worse   4. Discharge Medication List as of 9/8/2022 10:19 PM         Remove if admitted/transferred    Diagnosis/Clinical Impression     Clinical Impression:   1. Injury of conjunctiva and corneal abrasion without foreign body, left eye, initial encounter        Attestations: Leesa Jung MD, am the primary clinician of record. Please note that this dictation was completed with [employer], the [employer] voice recognition software. Quite often unanticipated grammatical, syntax, homophones, and other interpretive errors are inadvertently transcribed by the computer software. Please disregard these errors. Please excuse any errors that have escaped final proofreading. Thank you.

## 2022-12-06 ENCOUNTER — TELEPHONE (OUTPATIENT)
Dept: UROLOGY | Age: 57
End: 2022-12-06

## 2022-12-08 ENCOUNTER — HOSPITAL ENCOUNTER (OUTPATIENT)
Dept: GENERAL RADIOLOGY | Age: 57
End: 2022-12-08
Payer: COMMERCIAL

## 2022-12-08 DIAGNOSIS — N20.0 KIDNEY STONES: ICD-10-CM

## 2022-12-08 PROCEDURE — 74018 RADEX ABDOMEN 1 VIEW: CPT

## 2022-12-09 NOTE — PROGRESS NOTES
HISTORY OF PRESENT ILLNESS    Nicho Ramírez is a 62 y.o. male. Ct scan from 4/28/22 revealed 6.5 mm obstructing stone left UPJ  He is s/p on 5/2/22 stent removal  He is s/p on 4/28/22  Left Ureteroscopy nephroscopy laser to stones that ureteropelvic junction removal stone fragments diagnostic fluoroscopy cystoscopy and double-J stent placed    KUB from 12/8/22  Small, 2-3 mm calcification over the mid left  kidney is unchanged. Several pelvic phleboliths. The osseous structures are  normal.  Fevers, chills, nausea, vomiting weight loss bone pain , has he is feeling great  Today he denies any flank pain, N/V, gross hematuria, frequency or urgency. He has no complains. Urine is clean no blood or leuk. Past Medical History:  PMHx (including negatives):  has a past medical history of Kidney calculi and Kidney stones. PSurgHx:  has a past surgical history that includes hx urological (05/02/2022); hx urological (04/28/2022); hx urological (04/28/2022); hx urological (04/28/2022); and hx urological (04/28/2022). PSocHx:  reports that he has never smoked. He has never used smokeless tobacco. He reports that he does not currently use alcohol. He reports that he does not use drugs. Home Medications    Medication Sig Start Date End Date Taking? Authorizing Provider   erythromycin (ILOTYCIN) ophthalmic ointment Instill ~1 cm ribbon into affected eye 4 times daily for 7 days 9/8/22  Yes David Hernandez MD        Chronic Conditions Addressed Today       1. Right renal stone - Primary     Overview      5/2/22- cystoscopy and stent removal  4/28/22  Ureteroscopy nephroscopy laser to stones that ureteropelvic junction removal stone fragments diagnostic fluoroscopy cystoscopy and double-J stent placed     On 4/30/22 presents to the ED with acute onset of pain.   He was admitted recently for obstructive nephrolithiasis and now has recurrence of pain in the left flank, nothing specific makes it better or worse and seems to be somewhat triggered by when he is micturating and after micturating his pain with lasts longer than before. Ct scan on 4/30/22  Left ureteral stent has been placed since study of 2 days ago, acceptably  positioned and there is no longer hydronephrosis. Eldon Merida remains in the  midportion of the kidney. There is no ureter stone. Ct scan 4/28/22  IMPRESSION  1. 6.5 mm obstructing stone left UPJ. Left perinephric inflammation. Correlate  for medical renal disease to include pyelonephritis. 2. Nonobstructing bilateral nephrolithiasis. Patient appears to still have a calcification in his kidney. This was seen on a CAT scan. His main complaints is stent pain. He presently denies fevers chills nausea and vomiting          Relevant Orders     AMB POC URINALYSIS DIP STICK AUTO W/O MICRO (Completed)    2. Retained ureteral stent    3. Left ureteral stone     Relevant Orders     XR ABD (KUB)       Review of Systems   Constitutional: Negative. HENT: Negative. Eyes: Negative. Shingles on rye   Respiratory: Negative. Cardiovascular: Negative. Gastrointestinal: Negative. Genitourinary: Negative. Musculoskeletal: Negative. Skin: Negative. Neurological: Negative. Endo/Heme/Allergies: Negative. Psychiatric/Behavioral: Negative. Patient denies the symptoms of COVID-19 per routine screening guidelines. Physical Exam  HENT:      Head: Normocephalic and atraumatic. Cardiovascular:      Rate and Rhythm: Normal rate and regular rhythm. Pulmonary:      Effort: Pulmonary effort is normal.      Breath sounds: Normal breath sounds. Abdominal:      Palpations: Abdomen is soft. Tenderness: There is  no tenderness. Skin:     General: Skin is warm and dry. Neurological:      General: No focal deficit present.       Mental Status: He is alert and oriented to person, place, and time  ASSESSMENT and PLAN     Persistent stones with a very small he has no problems no complaints, takes no medications. We will have him push the fluids see me back in 6 months check a KUB at that time. Take Advil for pain          Kenneth Sunshine. may have a reminder for a \"due or due soon\" health maintenance. The patient has been encouraged to contact their primary care provider for follow-up on this health maintenance or other necessary and/or routine health screening.

## 2022-12-12 ENCOUNTER — OFFICE VISIT (OUTPATIENT)
Dept: UROLOGY | Age: 57
End: 2022-12-12
Payer: COMMERCIAL

## 2022-12-12 VITALS
TEMPERATURE: 97.8 F | HEIGHT: 70 IN | RESPIRATION RATE: 16 BRPM | OXYGEN SATURATION: 94 % | SYSTOLIC BLOOD PRESSURE: 126 MMHG | HEART RATE: 75 BPM | DIASTOLIC BLOOD PRESSURE: 58 MMHG | WEIGHT: 200 LBS | BODY MASS INDEX: 28.63 KG/M2

## 2022-12-12 DIAGNOSIS — Z96.0 RETAINED URETERAL STENT: ICD-10-CM

## 2022-12-12 DIAGNOSIS — N20.0 RIGHT RENAL STONE: Primary | ICD-10-CM

## 2022-12-12 DIAGNOSIS — N20.1 LEFT URETERAL STONE: ICD-10-CM

## 2022-12-12 LAB
BILIRUB UR QL: NEGATIVE
GLUCOSE UR-MCNC: NEGATIVE MG/DL
KETONES P FAST UR STRIP-MCNC: NEGATIVE MG/DL
PH UR STRIP: 6 [PH] (ref 4.6–8)
PROT UR QL STRIP: NEGATIVE
SP GR UR STRIP: 1 (ref 1–1.03)
UA UROBILINOGEN AMB POC: NORMAL (ref 0.2–1)
URINALYSIS CLARITY POC: CLEAR
URINALYSIS COLOR POC: YELLOW
URINE BLOOD POC: NEGATIVE
URINE LEUKOCYTES POC: NEGATIVE
URINE NITRITES POC: NEGATIVE

## 2022-12-12 PROCEDURE — 99213 OFFICE O/P EST LOW 20 MIN: CPT | Performed by: UROLOGY

## 2022-12-12 PROCEDURE — 81003 URINALYSIS AUTO W/O SCOPE: CPT | Performed by: UROLOGY

## 2022-12-12 NOTE — PROGRESS NOTES
Chief Complaint   Patient presents with    Follow-up    Kidney Stone     KUB completed 12/8/22  Patient is also participating in Clinical Study - Eyes       PHQ-9 score is    Negative    Vitals:    12/12/22 0848   BP: (!) 126/58   Pulse: 75   Resp: 16   Temp: 97.8 °F (36.6 °C)   TempSrc: Temporal   SpO2: 94%   Weight: 200 lb (90.7 kg)   Height: 5' 10\" (1.778 m)        1. \"Have you been to the ER, urgent care clinic since your last visit? Hospitalized since your last visit? \" No    2. \"Have you seen or consulted any other health care providers outside of the [address] [place] [address] since your last visit? \" No     3. For patients aged 39-70: Has the patient had a colonoscopy / FIT/ Cologuard? No      If the patient is female:    4. For patients aged 41-77: Has the patient had a mammogram within the past 2 years? NA - based on age or sex      11. For patients aged 21-65: Has the patient had a pap smear?  NA - based on age or sex

## 2023-04-22 DIAGNOSIS — N20.1 LEFT URETERAL STONE: Primary | ICD-10-CM

## 2023-06-29 ENCOUNTER — TELEPHONE (OUTPATIENT)
Age: 58
End: 2023-06-29

## 2023-06-29 DIAGNOSIS — N20.0 RIGHT RENAL STONE: Primary | ICD-10-CM

## 2023-06-29 DIAGNOSIS — N20.1 LEFT URETERAL STONE: ICD-10-CM

## 2023-10-28 ENCOUNTER — HOSPITAL ENCOUNTER (EMERGENCY)
Facility: HOSPITAL | Age: 58
Discharge: HOME | End: 2023-10-29
Payer: COMMERCIAL

## 2023-10-28 DIAGNOSIS — L02.212 ABSCESS OF BACK: Primary | ICD-10-CM

## 2023-10-28 PROCEDURE — 99283 EMERGENCY DEPT VISIT LOW MDM: CPT

## 2023-10-29 VITALS
OXYGEN SATURATION: 100 % | HEART RATE: 78 BPM | DIASTOLIC BLOOD PRESSURE: 82 MMHG | HEIGHT: 70 IN | BODY MASS INDEX: 28.63 KG/M2 | RESPIRATION RATE: 18 BRPM | TEMPERATURE: 98.2 F | WEIGHT: 200 LBS | SYSTOLIC BLOOD PRESSURE: 135 MMHG

## 2023-10-29 RX ORDER — CEPHALEXIN 500 MG/1
500 CAPSULE ORAL 4 TIMES DAILY
Qty: 28 CAPSULE | Refills: 0 | Status: SHIPPED | OUTPATIENT
Start: 2023-10-29 | End: 2023-10-30 | Stop reason: RX

## 2023-10-29 RX ORDER — DOXYCYCLINE HYCLATE 100 MG
100 TABLET ORAL 2 TIMES DAILY
Qty: 14 TABLET | Refills: 0 | Status: ON HOLD | OUTPATIENT
Start: 2023-10-29 | End: 2023-10-31 | Stop reason: HOSPADM

## 2023-10-29 RX ORDER — DOXYCYCLINE HYCLATE 100 MG/1
100 CAPSULE ORAL
Status: DISCONTINUED | OUTPATIENT
Start: 2023-10-29 | End: 2023-10-29 | Stop reason: HOSPADM

## 2023-10-29 RX ORDER — CEPHALEXIN 500 MG/1
500 CAPSULE ORAL
Status: DISCONTINUED | OUTPATIENT
Start: 2023-10-29 | End: 2023-10-29 | Stop reason: HOSPADM

## 2023-10-29 NOTE — ED TRIAGE NOTES
Has had small \"lump\" in the middle of back x 6 months. Noted that it had gotten some bigger last 2 days so \"came to get it drained\".

## 2023-10-29 NOTE — ED PROVIDER NOTES
[facility] EMERGENCY DEPT  EMERGENCY DEPARTMENT HISTORY AND PHYSICAL EXAM      Date: 10/28/2023  Patient Name: Yareli Bush MRN: [id number]  YOB: 1965  Date of evaluation: 10/28/2023  Provider: NENA Parikh NP   Note Started: 11:48 PM EDT 10/28/23    HISTORY OF PRESENT ILLNESS     Chief Complaint   Patient presents with    skin problem       History Provided By: Patient    HPI: Yareli Bush is a 62 y.o. 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 Over the last two days it has increased in size and redness and he had a telehealth appointment with the provider suggesting he come to the ED to have it drained. He denies fever, chills, or any other systemic symptoms. PAST MEDICAL HISTORY   Past Medical History:  Past Medical History:   Diagnosis Date    Herpes zoster iritis of right eye     Kidney calculi     Kidney stones        Past Surgical History:  Past Surgical History:   Procedure Laterality Date    UROLOGICAL SURGERY  04/28/2022    cystoscopy and double-J stent placed    UROLOGICAL SURGERY  04/28/2022    fragments diagnostic fluoroscopy     UROLOGICAL SURGERY  04/28/2022     ureteropelvic junction removal stone    UROLOGICAL SURGERY  04/28/2022    Ureteroscopy nephroscopy laser to stones     UROLOGICAL SURGERY  05/02/2022    Cysto stent removal       Family History:  Family History   Problem Relation Age of Onset    No Known Problems Father     No Known Problems Mother        Social History:  Social History     Tobacco Use    Smoking status: Never    Smokeless tobacco: Never   Substance Use Topics    Alcohol use: Not Currently    Drug use: Never       Allergies:  No Known Allergies    PCP: No primary care provider on file.     Current Meds:   No current facility-administered

## 2023-10-30 RX ORDER — SULFAMETHOXAZOLE AND TRIMETHOPRIM 800; 160 MG/1; MG/1
1 TABLET ORAL 2 TIMES DAILY
Qty: 20 TABLET | Refills: 0 | Status: SHIPPED | OUTPATIENT
Start: 2023-10-30 | End: 2023-11-09

## 2023-10-31 ENCOUNTER — OFFICE VISIT (OUTPATIENT)
Age: 58
End: 2023-10-31
Payer: COMMERCIAL

## 2023-10-31 ENCOUNTER — HOSPITAL ENCOUNTER (OUTPATIENT)
Facility: HOSPITAL | Age: 58
Discharge: HOME | End: 2023-10-31
Attending: SURGERY | Admitting: SURGERY
Payer: COMMERCIAL

## 2023-10-31 ENCOUNTER — HOSPITAL ENCOUNTER (OUTPATIENT)
Facility: HOSPITAL | Age: 58
Discharge: HOME | End: 2023-11-03

## 2023-10-31 VITALS
HEART RATE: 61 BPM | TEMPERATURE: 97.5 F | HEIGHT: 70 IN | OXYGEN SATURATION: 94 % | BODY MASS INDEX: 30.06 KG/M2 | SYSTOLIC BLOOD PRESSURE: 121 MMHG | DIASTOLIC BLOOD PRESSURE: 78 MMHG | RESPIRATION RATE: 16 BRPM | WEIGHT: 210 LBS

## 2023-10-31 VITALS
BODY MASS INDEX: 31.35 KG/M2 | DIASTOLIC BLOOD PRESSURE: 76 MMHG | WEIGHT: 219 LBS | RESPIRATION RATE: 16 BRPM | OXYGEN SATURATION: 95 % | TEMPERATURE: 97.6 F | SYSTOLIC BLOOD PRESSURE: 119 MMHG | HEIGHT: 70 IN | HEART RATE: 65 BPM

## 2023-10-31 DIAGNOSIS — R22.2 MASS OF SUBCUTANEOUS TISSUE OF BACK: ICD-10-CM

## 2023-10-31 DIAGNOSIS — R22.2 MASS OF SUBCUTANEOUS TISSUE OF BACK: Primary | ICD-10-CM

## 2023-10-31 DIAGNOSIS — L02.212 ABSCESS OF BACK, EXCEPT BUTTOCK: Primary | ICD-10-CM

## 2023-10-31 DIAGNOSIS — L02.91 SOFT TISSUE ABSCESS: ICD-10-CM

## 2023-10-31 DIAGNOSIS — L02.212 BACK ABSCESS: ICD-10-CM

## 2023-10-31 PROCEDURE — 87205 SMEAR GRAM STAIN: CPT

## 2023-10-31 PROCEDURE — 87070 CULTURE OTHR SPECIMN AEROBIC: CPT

## 2023-10-31 PROCEDURE — 3600000012 HC SURGERY LEVEL 2 ADDTL 15MIN: Performed by: SURGERY

## 2023-10-31 PROCEDURE — 7100000011 HC PHASE II RECOVERY - ADDTL 15 MIN: Performed by: SURGERY

## 2023-10-31 PROCEDURE — 99024 POSTOP FOLLOW-UP VISIT: CPT | Performed by: SURGERY

## 2023-10-31 PROCEDURE — 2580000003 HC RX 258: Performed by: SURGERY

## 2023-10-31 PROCEDURE — 2709999900 HC NON-CHARGEABLE SUPPLY: Performed by: SURGERY

## 2023-10-31 PROCEDURE — 7100000010 HC PHASE II RECOVERY - FIRST 15 MIN: Performed by: SURGERY

## 2023-10-31 PROCEDURE — 6360000002 HC RX W HCPCS: Performed by: SURGERY

## 2023-10-31 PROCEDURE — 10061 I&D ABSCESS COMP/MULTIPLE: CPT | Performed by: SURGERY

## 2023-10-31 PROCEDURE — 2500000003 HC RX 250 WO HCPCS: Performed by: SURGERY

## 2023-10-31 PROCEDURE — 87076 CULTURE ANAEROBE IDENT EACH: CPT

## 2023-10-31 PROCEDURE — 99203 OFFICE O/P NEW LOW 30 MIN: CPT | Performed by: SURGERY

## 2023-10-31 PROCEDURE — 3600000002 HC SURGERY LEVEL 2 BASE: Performed by: SURGERY

## 2023-10-31 PROCEDURE — 76536 US EXAM OF HEAD AND NECK: CPT

## 2023-10-31 RX ORDER — ACETAMINOPHEN 325 MG/1
650 TABLET ORAL EVERY 6 HOURS PRN
Qty: 120 TABLET | Refills: 3 | Status: SHIPPED | OUTPATIENT
Start: 2023-10-31

## 2023-10-31 RX ORDER — OXYCODONE HYDROCHLORIDE 5 MG/1
5 TABLET ORAL EVERY 6 HOURS PRN
Qty: 20 TABLET | Refills: 0 | Status: SHIPPED | OUTPATIENT
Start: 2023-10-31 | End: 2023-11-07

## 2023-10-31 RX ORDER — METRONIDAZOLE 500 MG/1
500 TABLET ORAL 3 TIMES DAILY
Qty: 30 TABLET | Refills: 0 | Status: SHIPPED | OUTPATIENT
Start: 2023-10-31 | End: 2023-11-10

## 2023-10-31 RX ORDER — LIDOCAINE HYDROCHLORIDE AND EPINEPHRINE 10; 10 MG/ML; UG/ML
INJECTION, SOLUTION INFILTRATION; PERINEURAL PRN
Status: DISCONTINUED | OUTPATIENT
Start: 2023-10-31 | End: 2023-10-31 | Stop reason: ALTCHOICE

## 2023-10-31 RX ORDER — IBUPROFEN 200 MG
400 TABLET ORAL EVERY 6 HOURS PRN
Qty: 120 TABLET | Refills: 3 | Status: SHIPPED | OUTPATIENT
Start: 2023-10-31

## 2023-10-31 RX ORDER — SODIUM CHLORIDE, SODIUM LACTATE, POTASSIUM CHLORIDE, CALCIUM CHLORIDE 600; 310; 30; 20 MG/100ML; MG/100ML; MG/100ML; MG/100ML
INJECTION, SOLUTION INTRAVENOUS CONTINUOUS
Status: DISCONTINUED | OUTPATIENT
Start: 2023-10-31 | End: 2023-10-31 | Stop reason: HOSPADM

## 2023-10-31 RX ORDER — METRONIDAZOLE 500 MG/100ML
500 INJECTION, SOLUTION INTRAVENOUS ONCE
Status: COMPLETED | OUTPATIENT
Start: 2023-10-31 | End: 2023-10-31

## 2023-10-31 RX ADMIN — CEFAZOLIN 2000 MG: 1 INJECTION, POWDER, FOR SOLUTION INTRAMUSCULAR; INTRAVENOUS at 14:54

## 2023-10-31 RX ADMIN — METRONIDAZOLE 500 MG: 500 INJECTION, SOLUTION INTRAVENOUS at 14:57

## 2023-10-31 ASSESSMENT — ENCOUNTER SYMPTOMS
GASTROINTESTINAL NEGATIVE: 1
EYES NEGATIVE: 1
RESPIRATORY NEGATIVE: 1
ALLERGIC/IMMUNOLOGIC NEGATIVE: 1

## 2023-10-31 ASSESSMENT — PATIENT HEALTH QUESTIONNAIRE - PHQ9
SUM OF ALL RESPONSES TO PHQ9 QUESTIONS 1 & 2: 0
2. FEELING DOWN, DEPRESSED OR HOPELESS: 0
SUM OF ALL RESPONSES TO PHQ QUESTIONS 1-9: 0
1. LITTLE INTEREST OR PLEASURE IN DOING THINGS: 0

## 2023-10-31 ASSESSMENT — PAIN - FUNCTIONAL ASSESSMENT: PAIN_FUNCTIONAL_ASSESSMENT: NONE - DENIES PAIN

## 2023-10-31 ASSESSMENT — PAIN SCALES - GENERAL: PAINLEVEL_OUTOF10: 0

## 2023-10-31 NOTE — DISCHARGE INSTRUCTIONS
Pack wound lightly with iodoform packing strips and cover with dry gauze dressing once daily.   Okay to shower  No swimming or tub bathing x6 weeks  Activity as tolerated  Diet as tolerated

## 2023-10-31 NOTE — PERIOP NOTE
Patient alert and oriented x4, VS stable, no complaints of pain at this time. Wife in waiting room. Bed in low position, call bell within reach. Patient states okay to review and give discharge instructions to Lourdes Toledo, wife.

## 2023-10-31 NOTE — OP NOTE
Operative Note      Patient: Jesusita YOB: 1965  MRN: [id number]    Date of Procedure: 10/31/2023    Pre-Op Diagnosis Codes:     * Back abscess [L02.212]    Post-Op Diagnosis:  infected back epidermal inclusion cyst       Procedure(s):  BACK WOUND INCISION AND DRAINAGE  INCISION AND DRAINAGE OF BACK INFECTED EPIDERMAL INCLUSION CYST    Surgeon(s): Rosita Dyer MD    Assistant:   * No surgical staff found *    Anesthesia: Local    Estimated Blood Loss (mL): Minimal    Complications: None    Specimens:   ID Type Source Tests Collected by Time Destination   A : EPIDERMAL INCLUSION CYST Swab Back CULTURE, WOUND Inocente Davis MD 10/31/2023 1527        Implants:  * No implants in log *      Drains: * No LDAs found *    Findings: infected epidermal inclusion cyst        Detailed Description of Procedure: The patient was brought operating room and placed in the supine position. IV antibiotics were given prior to incision. He was then placed in prone position. Timeout was completed verifying correct patient, procedure, site, positioning, and special implants prior to beginning procedure. The mid back was was prepped and draped in usual sterile fashion. An incision was made directly overlying the area of fluctuance and cellulitis. Incision was carried down through skin into the subcutaneous tissue into the cavity of the infected epidermal inclusion cyst.  Some purulent fluid was drained and was sent for culture and Gram stain. Loculations were broken up digitally and the debris from the epidermal inclusion cyst was removed. The cavity was irrigated with sterile saline. The wound was packed with iodoform packing strip and covered with a dry dressing. The patient tolerated procedure well. He was transferred to recovery room in stable condition.     Electronically signed by Rosita Dyer MD on 10/31/2023 at 4:02 PM

## 2023-10-31 NOTE — PROGRESS NOTES
DC instructions reviewed with pt and his wife, both verb understanding. Pt to follow up with Dr Neo Sanchez in 2 weeks. Instructed on packing and dressing wound to back. Dressing is dry and intact.

## 2023-10-31 NOTE — PROGRESS NOTES
[address] [place] [address] MD Susan  [address] [facility], [address] [place] [address]  [phone]      Patient Name: Zo Pierre (59 y.o., male)    Patient Address: [address] [place] [id number]    PCP: No primary care provider on file. Patient contact numbers:  [unfilled] [unfilled]       Chief Complaint   Patient presents with    New Patient     Abcess on back    Painful lump on back    History of Present Illness  This is a 51-year-old male presents with painful lump on his back. This started as a pea-sized lump about 6 months ago and was not painful. However over the last 4 days it is increased significantly in size and is more painful. He went to the emergency room 2 days ago and was told he has an abscess and was referred to see me as an outpatient. He describes the pain as intermittent, 8 out of 10 in intensity, aggravated by palpation, no known alleviating factors, no history of similar symptoms in the past.  He denies fever, chills, drainage from the area, abdominal pain, nausea, vomiting, diarrhea, constipation, blood per rectum, melena, chest pain, shortness of breath.       Past Medical History:   Diagnosis Date    Herpes zoster iritis of right eye     Kidney calculi     Kidney stones        Past Surgical History:   Procedure Laterality Date    UROLOGICAL SURGERY  04/28/2022    cystoscopy and double-J stent placed    UROLOGICAL SURGERY  04/28/2022    fragments diagnostic fluoroscopy     UROLOGICAL SURGERY  04/28/2022     ureteropelvic junction removal stone    UROLOGICAL SURGERY  04/28/2022    Ureteroscopy nephroscopy laser to stones     UROLOGICAL SURGERY  05/02/2022    Cysto stent removal       Family History   Problem Relation Age of Onset    No Known Problems Father     No Known Problems Mother        Social History     Tobacco Use    Smoking status: Never    Smokeless tobacco: Never   Substance Use Topics    Alcohol use:

## 2023-11-02 LAB
BUN SERPL-MCNC: 17 MG/DL (ref 6–24)
CREAT SERPL-MCNC: 0.93 MG/DL (ref 0.76–1.27)
EGFRCR SERPLBLD CKD-EPI 2021: 95 ML/MIN/1.73

## 2023-11-03 LAB
BACTERIA SPEC CULT: ABNORMAL
BACTERIA SPEC CULT: ABNORMAL
GRAM STN SPEC: ABNORMAL
GRAM STN SPEC: ABNORMAL
Lab: ABNORMAL

## 2024-01-28 ENCOUNTER — APPOINTMENT (OUTPATIENT)
Facility: HOSPITAL | Age: 59
End: 2024-01-28
Payer: COMMERCIAL

## 2024-01-28 ENCOUNTER — HOSPITAL ENCOUNTER (EMERGENCY)
Facility: HOSPITAL | Age: 59
Discharge: HOME | End: 2024-01-28
Attending: STUDENT IN AN ORGANIZED HEALTH CARE EDUCATION/TRAINING PROGRAM
Payer: COMMERCIAL

## 2024-01-28 VITALS
HEART RATE: 98 BPM | WEIGHT: 210 LBS | OXYGEN SATURATION: 98 % | BODY MASS INDEX: 30.06 KG/M2 | HEIGHT: 70 IN | SYSTOLIC BLOOD PRESSURE: 140 MMHG | DIASTOLIC BLOOD PRESSURE: 72 MMHG | RESPIRATION RATE: 16 BRPM | TEMPERATURE: 97.9 F

## 2024-01-28 DIAGNOSIS — N17.9 AKI (ACUTE KIDNEY INJURY) (HCC): ICD-10-CM

## 2024-01-28 DIAGNOSIS — N20.0 KIDNEY STONE: Primary | ICD-10-CM

## 2024-01-28 LAB
ALBUMIN SERPL-MCNC: 3.5 G/DL (ref 3.5–5)
ALBUMIN/GLOB SERPL: 1.1 (ref 1.1–2.2)
ALP SERPL-CCNC: 93 U/L (ref 45–117)
ALT SERPL-CCNC: 29 U/L (ref 12–78)
ANION GAP BLD CALC-SCNC: 13
ANION GAP SERPL CALC-SCNC: 4 MMOL/L (ref 5–15)
APPEARANCE UR: CLEAR
AST SERPL W P-5'-P-CCNC: 12 U/L (ref 15–37)
BACTERIA URNS QL MICRO: NEGATIVE /HPF
BASOPHILS # BLD: 0 K/UL (ref 0–0.1)
BASOPHILS NFR BLD: 1 % (ref 0–1)
BILIRUB SERPL-MCNC: 0.3 MG/DL (ref 0.2–1)
BILIRUB UR QL: NEGATIVE
BUN SERPL-MCNC: 24 MG/DL (ref 6–20)
BUN/CREAT SERPL: 15 (ref 12–20)
CA-I BLD-MCNC: 1.1 MMOL/L (ref 1.12–1.32)
CA-I BLD-MCNC: 8.6 MG/DL (ref 8.5–10.1)
CHLORIDE BLD-SCNC: 114 MMOL/L (ref 98–107)
CHLORIDE SERPL-SCNC: 113 MMOL/L (ref 97–108)
CO2 BLD-SCNC: 22 MMOL/L
CO2 SERPL-SCNC: 26 MMOL/L (ref 21–32)
COLOR UR: ABNORMAL
CREAT SERPL-MCNC: 1.55 MG/DL (ref 0.7–1.3)
CREAT UR-MCNC: 0.92 MG/DL (ref 0.6–1.3)
DIFFERENTIAL METHOD BLD: NORMAL
EOSINOPHIL # BLD: 0.1 K/UL (ref 0–0.4)
EOSINOPHIL NFR BLD: 2 % (ref 0–7)
ERYTHROCYTE [DISTWIDTH] IN BLOOD BY AUTOMATED COUNT: 12.1 % (ref 11.5–14.5)
GLOBULIN SER CALC-MCNC: 3.3 G/DL (ref 2–4)
GLUCOSE BLD STRIP.AUTO-MCNC: 93 MG/DL (ref 65–100)
GLUCOSE SERPL-MCNC: 113 MG/DL (ref 65–100)
GLUCOSE UR STRIP.AUTO-MCNC: NEGATIVE MG/DL
HCT VFR BLD AUTO: 46.6 % (ref 36.6–50.3)
HGB BLD-MCNC: 15.4 G/DL (ref 12.1–17)
HGB UR QL STRIP: NEGATIVE
IMM GRANULOCYTES # BLD AUTO: 0 K/UL (ref 0–0.04)
IMM GRANULOCYTES NFR BLD AUTO: 0 % (ref 0–0.5)
KETONES UR QL STRIP.AUTO: NEGATIVE MG/DL
LACTATE BLD-SCNC: 0.8 MMOL/L (ref 0.4–2)
LEUKOCYTE ESTERASE UR QL STRIP.AUTO: ABNORMAL
LYMPHOCYTES # BLD: 1.8 K/UL (ref 0.8–3.5)
LYMPHOCYTES NFR BLD: 23 % (ref 12–49)
MCH RBC QN AUTO: 31.6 PG (ref 26–34)
MCHC RBC AUTO-ENTMCNC: 33 G/DL (ref 30–36.5)
MCV RBC AUTO: 95.5 FL (ref 80–99)
MONOCYTES # BLD: 0.7 K/UL (ref 0–1)
MONOCYTES NFR BLD: 10 % (ref 5–13)
NEUTS SEG # BLD: 5.1 K/UL (ref 1.8–8)
NEUTS SEG NFR BLD: 64 % (ref 32–75)
NITRITE UR QL STRIP.AUTO: NEGATIVE
NRBC # BLD: 0 K/UL (ref 0–0.01)
NRBC BLD-RTO: 0 PER 100 WBC
PERFORMED BY:: ABNORMAL
PH UR STRIP: 5 (ref 5–8)
PLATELET # BLD AUTO: 209 K/UL (ref 150–400)
PMV BLD AUTO: 10.3 FL (ref 8.9–12.9)
POTASSIUM BLD-SCNC: 3.5 MMOL/L (ref 3.5–5.5)
POTASSIUM SERPL-SCNC: 3.7 MMOL/L (ref 3.5–5.1)
PROT SERPL-MCNC: 6.8 G/DL (ref 6.4–8.2)
PROT UR STRIP-MCNC: NEGATIVE MG/DL
RBC # BLD AUTO: 4.88 M/UL (ref 4.1–5.7)
RBC #/AREA URNS HPF: ABNORMAL /HPF (ref 0–5)
SODIUM BLD-SCNC: 148 MMOL/L (ref 136–145)
SODIUM SERPL-SCNC: 143 MMOL/L (ref 136–145)
SP GR UR REFRACTOMETRY: 1.03 (ref 1–1.03)
SPECIMEN SITE: ABNORMAL
URINE CULTURE IF INDICATED: ABNORMAL
UROBILINOGEN UR QL STRIP.AUTO: 0.1 EU/DL (ref 0.1–1)
WBC # BLD AUTO: 7.8 K/UL (ref 4.1–11.1)
WBC URNS QL MICRO: ABNORMAL /HPF (ref 0–4)

## 2024-01-28 PROCEDURE — 99284 EMERGENCY DEPT VISIT MOD MDM: CPT

## 2024-01-28 PROCEDURE — 6360000002 HC RX W HCPCS: Performed by: STUDENT IN AN ORGANIZED HEALTH CARE EDUCATION/TRAINING PROGRAM

## 2024-01-28 PROCEDURE — 80047 BASIC METABLC PNL IONIZED CA: CPT

## 2024-01-28 PROCEDURE — 74176 CT ABD & PELVIS W/O CONTRAST: CPT

## 2024-01-28 PROCEDURE — 83605 ASSAY OF LACTIC ACID: CPT

## 2024-01-28 PROCEDURE — 96374 THER/PROPH/DIAG INJ IV PUSH: CPT

## 2024-01-28 PROCEDURE — 85025 COMPLETE CBC W/AUTO DIFF WBC: CPT

## 2024-01-28 PROCEDURE — 81001 URINALYSIS AUTO W/SCOPE: CPT

## 2024-01-28 PROCEDURE — 2580000003 HC RX 258: Performed by: STUDENT IN AN ORGANIZED HEALTH CARE EDUCATION/TRAINING PROGRAM

## 2024-01-28 PROCEDURE — 80053 COMPREHEN METABOLIC PANEL: CPT

## 2024-01-28 PROCEDURE — 6370000000 HC RX 637 (ALT 250 FOR IP): Performed by: STUDENT IN AN ORGANIZED HEALTH CARE EDUCATION/TRAINING PROGRAM

## 2024-01-28 RX ORDER — KETOROLAC TROMETHAMINE 30 MG/ML
30 INJECTION, SOLUTION INTRAMUSCULAR; INTRAVENOUS ONCE
Status: COMPLETED | OUTPATIENT
Start: 2024-01-28 | End: 2024-01-28

## 2024-01-28 RX ORDER — 0.9 % SODIUM CHLORIDE 0.9 %
1000 INTRAVENOUS SOLUTION INTRAVENOUS ONCE
Status: COMPLETED | OUTPATIENT
Start: 2024-01-28 | End: 2024-01-28

## 2024-01-28 RX ORDER — TAMSULOSIN HYDROCHLORIDE 0.4 MG/1
0.4 CAPSULE ORAL DAILY
Qty: 7 CAPSULE | Refills: 0 | Status: SHIPPED | OUTPATIENT
Start: 2024-01-28 | End: 2024-02-04

## 2024-01-28 RX ORDER — TAMSULOSIN HYDROCHLORIDE 0.4 MG/1
0.4 CAPSULE ORAL ONCE
Status: COMPLETED | OUTPATIENT
Start: 2024-01-28 | End: 2024-01-28

## 2024-01-28 RX ORDER — KETOROLAC TROMETHAMINE 10 MG/1
10 TABLET, FILM COATED ORAL EVERY 6 HOURS PRN
Qty: 20 TABLET | Refills: 0 | Status: SHIPPED | OUTPATIENT
Start: 2024-01-28

## 2024-01-28 RX ORDER — ONDANSETRON 4 MG/1
4 TABLET, ORALLY DISINTEGRATING ORAL EVERY 8 HOURS PRN
Qty: 20 TABLET | Refills: 0 | Status: SHIPPED | OUTPATIENT
Start: 2024-01-28

## 2024-01-28 RX ADMIN — TAMSULOSIN HYDROCHLORIDE 0.4 MG: 0.4 CAPSULE ORAL at 03:50

## 2024-01-28 RX ADMIN — KETOROLAC TROMETHAMINE 30 MG: 30 INJECTION, SOLUTION INTRAMUSCULAR; INTRAVENOUS at 03:50

## 2024-01-28 RX ADMIN — SODIUM CHLORIDE 1000 ML: 9 INJECTION, SOLUTION INTRAVENOUS at 03:50

## 2024-01-28 ASSESSMENT — PAIN - FUNCTIONAL ASSESSMENT
PAIN_FUNCTIONAL_ASSESSMENT: NONE - DENIES PAIN
PAIN_FUNCTIONAL_ASSESSMENT: 0-10

## 2024-01-28 ASSESSMENT — PAIN SCALES - GENERAL
PAINLEVEL_OUTOF10: 0
PAINLEVEL_OUTOF10: 8

## 2024-01-28 NOTE — DISCHARGE INSTRUCTIONS
Est, Glom Filt Rate 52 (L) >60 ml/min/1.73m2    Calcium 8.6 8.5 - 10.1 mg/dL    Total Bilirubin 0.3 0.2 - 1.0 mg/dL    AST 12 (L) 15 - 37 U/L    ALT 29 12 - 78 U/L    Alk Phosphatase 93 45 - 117 U/L    Total Protein 6.8 6.4 - 8.2 g/dL    Albumin 3.5 3.5 - 5.0 g/dL    Globulin 3.3 2.0 - 4.0 g/dL    Albumin/Globulin Ratio 1.1 1.1 - 2.2     Urinalysis with Reflex to Culture    Collection Time: 01/28/24  2:52 AM    Specimen: Urine   Result Value Ref Range    Color, UA Yellow/Straw      Appearance Clear Clear      Specific Gravity, UA 1.026 1.003 - 1.030      pH, Urine 5.0 5.0 - 8.0      Protein, UA Negative Negative mg/dL    Glucose, UA Negative Negative mg/dL    Ketones, Urine Negative Negative mg/dL    Bilirubin Urine Negative Negative      Blood, Urine Negative Negative      Urobilinogen, Urine 0.1 0.1 - 1.0 EU/dL    Nitrite, Urine Negative Negative      Leukocyte Esterase, Urine Trace (A) Negative      WBC, UA 0-4 0 - 4 /hpf    RBC, UA 0-5 0 - 5 /hpf    BACTERIA, URINE Negative Negative /hpf    Urine Culture if Indicated Culture not indicated by UA result Culture not indicated by UA result     POC CHEMISTRY (NA,K,ICA,GLU,CALC HCT/HGB,LACTATE,CREA,CL)    Collection Time: 01/28/24  4:53 AM   Result Value Ref Range    POC Sodium 148 (H) 136 - 145 mmol/L    POC Potassium 3.5 3.5 - 5.5 mmol/L    POC Ionized Calcium 1.10 (L) 1.12 - 1.32 mmol/L    POC Chloride 114 (H) 98 - 107 MMOL/L    POC Creatinine 0.92 0.6 - 1.3 MG/DL    Anion Gap, POC 13      POC Glucose 93 65 - 100 mg/dL    eGFR, POC >60 >60 ml/min/1.73m2    POC TCO2 22 MMOL/L    POC Lactic Acid 0.80 0.40 - 2.00 mmol/L    Source Venous      Performed by: JOSELYN GLASGOW        Radiologic Studies -   CT ABDOMEN PELVIS WO CONTRAST Additional Contrast? None   Final Result   5 mm proximal left ureteral calculus produces mild left-sided hydronephrosis.   Small nonobstructing right renal calculi. Old granulomatous disease in the   chest.             If you feel that you

## 2024-01-28 NOTE — ED PROVIDER NOTES
[facility]  EMERGENCY DEPARTMENT ENCOUNTER NOTE    Date: 1/28/2024  Patient Name: Yaw Delvalle Jr.    History of Presenting Illness     Chief Complaint   Patient presents with    Flank Pain       History obtained from: Patient    HPI: Yaw Delvalle Jr., 58 y.o. male with past medical history as listed and reviewed below presents for flank pain.  He started having acute left-sided flank pain that is waxing and waning associated with nausea similar to usual kidney stones.  He follows with urology.  No dysuria or hematuria.  No fevers or chills.  No chest pain or shortness of breath.  No other complaints.    Medical History   I reviewed the medical, surgical, family, and social history, as well as allergies:    PCP: No primary care provider on file.    Past Medical History:  Past Medical History:   Diagnosis Date    Herpes zoster iritis of right eye     Kidney calculi     Kidney stones      Past Surgical History:  Past Surgical History:   Procedure Laterality Date    BACK SURGERY N/A 10/31/2023    BACK WOUND INCISION AND DRAINAGE INCISION AND DRAINAGE OF BACK INFECTED EPIDERMAL INCLUSION CYST  performed by Shira Davis MD at [facility] MAIN OR    UROLOGICAL SURGERY  04/28/2022    cystoscopy and double-J stent placed    UROLOGICAL SURGERY  04/28/2022    fragments diagnostic fluoroscopy     UROLOGICAL SURGERY  04/28/2022     ureteropelvic junction removal stone    UROLOGICAL SURGERY  04/28/2022    Ureteroscopy nephroscopy laser to stones     UROLOGICAL SURGERY  05/02/2022    Cysto stent removal     Current Outpatient Medications:  Current Outpatient Medications   Medication Instructions    acetaminophen (AMINOFEN) 650 mg, Oral, EVERY 6 HOURS PRN    erythromycin (ROMYCIN) 5 MG/GM ophthalmic ointment Instill ~1 cm ribbon into affected eye 4 times daily for 7 days    ibuprofen (ADVIL) 400 mg, Oral, EVERY 6 HOURS PRN    ketorolac (TORADOL) 10 mg, Oral, EVERY 6 HOURS PRN    ondansetron

## 2024-01-28 NOTE — ED TRIAGE NOTES
Onset yesterday afternoon of left flank pain.  +n/v.  Hx of 6mm kidney stone for past 6 months, followed by Dr. Ordonez.

## 2024-01-29 ENCOUNTER — APPOINTMENT (OUTPATIENT)
Facility: HOSPITAL | Age: 59
End: 2024-01-29
Attending: UROLOGY
Payer: COMMERCIAL

## 2024-01-29 ENCOUNTER — HOSPITAL ENCOUNTER (OUTPATIENT)
Facility: HOSPITAL | Age: 59
Discharge: HOME | End: 2024-01-29
Attending: UROLOGY | Admitting: UROLOGY
Payer: COMMERCIAL

## 2024-01-29 ENCOUNTER — ANESTHESIA (OUTPATIENT)
Facility: HOSPITAL | Age: 59
End: 2024-01-29
Payer: COMMERCIAL

## 2024-01-29 ENCOUNTER — OFFICE VISIT (OUTPATIENT)
Age: 59
End: 2024-01-29
Payer: COMMERCIAL

## 2024-01-29 ENCOUNTER — ANESTHESIA EVENT (OUTPATIENT)
Facility: HOSPITAL | Age: 59
End: 2024-01-29
Payer: COMMERCIAL

## 2024-01-29 VITALS — DIASTOLIC BLOOD PRESSURE: 77 MMHG | HEART RATE: 71 BPM | SYSTOLIC BLOOD PRESSURE: 141 MMHG

## 2024-01-29 VITALS
HEART RATE: 64 BPM | SYSTOLIC BLOOD PRESSURE: 132 MMHG | TEMPERATURE: 97.6 F | OXYGEN SATURATION: 96 % | RESPIRATION RATE: 18 BRPM | DIASTOLIC BLOOD PRESSURE: 80 MMHG

## 2024-01-29 DIAGNOSIS — N13.30 HYDRONEPHROSIS OF LEFT KIDNEY: ICD-10-CM

## 2024-01-29 DIAGNOSIS — N20.1 URETERAL CALCULUS: ICD-10-CM

## 2024-01-29 DIAGNOSIS — N23 RENAL COLIC ON LEFT SIDE: ICD-10-CM

## 2024-01-29 DIAGNOSIS — G89.18 POST-OP PAIN: Primary | ICD-10-CM

## 2024-01-29 DIAGNOSIS — N20.1 URETERAL STONE: Primary | ICD-10-CM

## 2024-01-29 PROBLEM — N20.0 URIC ACID NEPHROLITHIASIS: Status: ACTIVE | Noted: 2024-01-29

## 2024-01-29 LAB
BILIRUBIN, URINE, POC: NEGATIVE
BLOOD URINE, POC: NEGATIVE
GLUCOSE URINE, POC: NEGATIVE
KETONES, URINE, POC: NEGATIVE
LEUKOCYTE ESTERASE, URINE, POC: ABNORMAL
NITRITE, URINE, POC: NEGATIVE
PH, URINE, POC: 6 (ref 4.6–8)
PROTEIN,URINE, POC: NEGATIVE
SPECIFIC GRAVITY, URINE, POC: 1.02 (ref 1–1.03)
URINALYSIS CLARITY, POC: CLEAR
URINALYSIS COLOR, POC: YELLOW
UROBILINOGEN, POC: ABNORMAL

## 2024-01-29 PROCEDURE — 2580000003 HC RX 258: Performed by: NURSE ANESTHETIST, CERTIFIED REGISTERED

## 2024-01-29 PROCEDURE — 7100000001 HC PACU RECOVERY - ADDTL 15 MIN: Performed by: UROLOGY

## 2024-01-29 PROCEDURE — 2709999900 HC NON-CHARGEABLE SUPPLY: Performed by: UROLOGY

## 2024-01-29 PROCEDURE — 3600000004 HC SURGERY LEVEL 4 BASE: Performed by: UROLOGY

## 2024-01-29 PROCEDURE — 6360000002 HC RX W HCPCS: Performed by: NURSE ANESTHETIST, CERTIFIED REGISTERED

## 2024-01-29 PROCEDURE — 2500000003 HC RX 250 WO HCPCS: Performed by: NURSE ANESTHETIST, CERTIFIED REGISTERED

## 2024-01-29 PROCEDURE — 76000 FLUOROSCOPY <1 HR PHYS/QHP: CPT

## 2024-01-29 PROCEDURE — C2617 STENT, NON-COR, TEM W/O DEL: HCPCS | Performed by: UROLOGY

## 2024-01-29 PROCEDURE — 3700000000 HC ANESTHESIA ATTENDED CARE: Performed by: UROLOGY

## 2024-01-29 PROCEDURE — 3600000014 HC SURGERY LEVEL 4 ADDTL 15MIN: Performed by: UROLOGY

## 2024-01-29 PROCEDURE — C1769 GUIDE WIRE: HCPCS | Performed by: UROLOGY

## 2024-01-29 PROCEDURE — 2720000010 HC SURG SUPPLY STERILE: Performed by: UROLOGY

## 2024-01-29 PROCEDURE — 3700000001 HC ADD 15 MINUTES (ANESTHESIA): Performed by: UROLOGY

## 2024-01-29 PROCEDURE — 6370000000 HC RX 637 (ALT 250 FOR IP): Performed by: UROLOGY

## 2024-01-29 PROCEDURE — 52356 CYSTO/URETERO W/LITHOTRIPSY: CPT | Performed by: UROLOGY

## 2024-01-29 PROCEDURE — 7100000000 HC PACU RECOVERY - FIRST 15 MIN: Performed by: UROLOGY

## 2024-01-29 PROCEDURE — 81003 URINALYSIS AUTO W/O SCOPE: CPT | Performed by: UROLOGY

## 2024-01-29 PROCEDURE — 7100000011 HC PHASE II RECOVERY - ADDTL 15 MIN: Performed by: UROLOGY

## 2024-01-29 PROCEDURE — 7100000010 HC PHASE II RECOVERY - FIRST 15 MIN: Performed by: UROLOGY

## 2024-01-29 PROCEDURE — 82360 CALCULUS ASSAY QUANT: CPT

## 2024-01-29 PROCEDURE — 99214 OFFICE O/P EST MOD 30 MIN: CPT | Performed by: UROLOGY

## 2024-01-29 DEVICE — VARIABLE LENGTH URETERAL STENT
Type: IMPLANTABLE DEVICE | Site: URETER | Status: FUNCTIONAL
Brand: CONTOUR VL™

## 2024-01-29 RX ORDER — ONDANSETRON 2 MG/ML
INJECTION INTRAMUSCULAR; INTRAVENOUS PRN
Status: DISCONTINUED | OUTPATIENT
Start: 2024-01-29 | End: 2024-01-29 | Stop reason: SDUPTHER

## 2024-01-29 RX ORDER — PHENYLEPHRINE HCL IN 0.9% NACL 1 MG/10 ML
SYRINGE (ML) INTRAVENOUS PRN
Status: DISCONTINUED | OUTPATIENT
Start: 2024-01-29 | End: 2024-01-29 | Stop reason: SDUPTHER

## 2024-01-29 RX ORDER — PHENAZOPYRIDINE HYDROCHLORIDE 99.5 MG/1
1 TABLET ORAL 3 TIMES DAILY PRN
Qty: 30 TABLET | Refills: 0 | Status: SHIPPED | OUTPATIENT
Start: 2024-01-29 | End: 2024-02-08

## 2024-01-29 RX ORDER — SODIUM CHLORIDE 0.9 % (FLUSH) 0.9 %
5-40 SYRINGE (ML) INJECTION PRN
Status: DISCONTINUED | OUTPATIENT
Start: 2024-01-29 | End: 2024-01-29 | Stop reason: HOSPADM

## 2024-01-29 RX ORDER — ROCURONIUM BROMIDE 10 MG/ML
INJECTION, SOLUTION INTRAVENOUS PRN
Status: DISCONTINUED | OUTPATIENT
Start: 2024-01-29 | End: 2024-01-29 | Stop reason: SDUPTHER

## 2024-01-29 RX ORDER — DIPHENHYDRAMINE HYDROCHLORIDE 50 MG/ML
12.5 INJECTION INTRAMUSCULAR; INTRAVENOUS
Status: DISCONTINUED | OUTPATIENT
Start: 2024-01-29 | End: 2024-01-29 | Stop reason: HOSPADM

## 2024-01-29 RX ORDER — SODIUM CHLORIDE, SODIUM LACTATE, POTASSIUM CHLORIDE, CALCIUM CHLORIDE 600; 310; 30; 20 MG/100ML; MG/100ML; MG/100ML; MG/100ML
INJECTION, SOLUTION INTRAVENOUS CONTINUOUS PRN
Status: DISCONTINUED | OUTPATIENT
Start: 2024-01-29 | End: 2024-01-29 | Stop reason: SDUPTHER

## 2024-01-29 RX ORDER — GLYCOPYRROLATE 0.2 MG/ML
INJECTION INTRAMUSCULAR; INTRAVENOUS PRN
Status: DISCONTINUED | OUTPATIENT
Start: 2024-01-29 | End: 2024-01-29 | Stop reason: SDUPTHER

## 2024-01-29 RX ORDER — PROPOFOL 10 MG/ML
INJECTION, EMULSION INTRAVENOUS PRN
Status: DISCONTINUED | OUTPATIENT
Start: 2024-01-29 | End: 2024-01-29 | Stop reason: SDUPTHER

## 2024-01-29 RX ORDER — LIDOCAINE HYDROCHLORIDE 20 MG/ML
JELLY TOPICAL PRN
Status: DISCONTINUED | OUTPATIENT
Start: 2024-01-29 | End: 2024-01-29 | Stop reason: ALTCHOICE

## 2024-01-29 RX ORDER — SULFAMETHOXAZOLE AND TRIMETHOPRIM 800; 160 MG/1; MG/1
1 TABLET ORAL 2 TIMES DAILY
Qty: 10 TABLET | Refills: 0 | Status: SHIPPED | OUTPATIENT
Start: 2024-01-29 | End: 2024-02-03

## 2024-01-29 RX ORDER — LABETALOL HYDROCHLORIDE 5 MG/ML
10 INJECTION, SOLUTION INTRAVENOUS
Status: DISCONTINUED | OUTPATIENT
Start: 2024-01-29 | End: 2024-01-29 | Stop reason: HOSPADM

## 2024-01-29 RX ORDER — KETOROLAC TROMETHAMINE 30 MG/ML
INJECTION, SOLUTION INTRAMUSCULAR; INTRAVENOUS PRN
Status: DISCONTINUED | OUTPATIENT
Start: 2024-01-29 | End: 2024-01-29 | Stop reason: SDUPTHER

## 2024-01-29 RX ORDER — LIDOCAINE 4 G/G
1 PATCH TOPICAL AS NEEDED
Status: DISCONTINUED | OUTPATIENT
Start: 2024-01-29 | End: 2024-01-29 | Stop reason: HOSPADM

## 2024-01-29 RX ORDER — FENTANYL CITRATE 50 UG/ML
50 INJECTION, SOLUTION INTRAMUSCULAR; INTRAVENOUS EVERY 5 MIN PRN
Status: DISCONTINUED | OUTPATIENT
Start: 2024-01-29 | End: 2024-01-29 | Stop reason: HOSPADM

## 2024-01-29 RX ORDER — SODIUM CHLORIDE, SODIUM LACTATE, POTASSIUM CHLORIDE, CALCIUM CHLORIDE 600; 310; 30; 20 MG/100ML; MG/100ML; MG/100ML; MG/100ML
INJECTION, SOLUTION INTRAVENOUS ONCE
Status: DISCONTINUED | OUTPATIENT
Start: 2024-01-29 | End: 2024-01-29 | Stop reason: HOSPADM

## 2024-01-29 RX ORDER — OXYCODONE HYDROCHLORIDE 5 MG/1
10 TABLET ORAL PRN
Status: DISCONTINUED | OUTPATIENT
Start: 2024-01-29 | End: 2024-01-29 | Stop reason: HOSPADM

## 2024-01-29 RX ORDER — HYDRALAZINE HYDROCHLORIDE 20 MG/ML
10 INJECTION INTRAMUSCULAR; INTRAVENOUS
Status: DISCONTINUED | OUTPATIENT
Start: 2024-01-29 | End: 2024-01-29 | Stop reason: HOSPADM

## 2024-01-29 RX ORDER — MIDAZOLAM HYDROCHLORIDE 1 MG/ML
INJECTION INTRAMUSCULAR; INTRAVENOUS PRN
Status: DISCONTINUED | OUTPATIENT
Start: 2024-01-29 | End: 2024-01-29 | Stop reason: SDUPTHER

## 2024-01-29 RX ORDER — SODIUM CHLORIDE 0.9 % (FLUSH) 0.9 %
5-40 SYRINGE (ML) INJECTION EVERY 12 HOURS SCHEDULED
Status: DISCONTINUED | OUTPATIENT
Start: 2024-01-29 | End: 2024-01-29 | Stop reason: HOSPADM

## 2024-01-29 RX ORDER — LORAZEPAM 2 MG/ML
0.5 INJECTION INTRAMUSCULAR
Status: DISCONTINUED | OUTPATIENT
Start: 2024-01-29 | End: 2024-01-29 | Stop reason: HOSPADM

## 2024-01-29 RX ORDER — LIDOCAINE HYDROCHLORIDE 20 MG/ML
INJECTION, SOLUTION EPIDURAL; INFILTRATION; INTRACAUDAL; PERINEURAL PRN
Status: DISCONTINUED | OUTPATIENT
Start: 2024-01-29 | End: 2024-01-29 | Stop reason: SDUPTHER

## 2024-01-29 RX ORDER — DEXMEDETOMIDINE HYDROCHLORIDE 100 UG/ML
INJECTION, SOLUTION INTRAVENOUS PRN
Status: DISCONTINUED | OUTPATIENT
Start: 2024-01-29 | End: 2024-01-29 | Stop reason: SDUPTHER

## 2024-01-29 RX ORDER — DEXAMETHASONE SODIUM PHOSPHATE 4 MG/ML
INJECTION, SOLUTION INTRA-ARTICULAR; INTRALESIONAL; INTRAMUSCULAR; INTRAVENOUS; SOFT TISSUE PRN
Status: DISCONTINUED | OUTPATIENT
Start: 2024-01-29 | End: 2024-01-29 | Stop reason: SDUPTHER

## 2024-01-29 RX ORDER — IPRATROPIUM BROMIDE AND ALBUTEROL SULFATE 2.5; .5 MG/3ML; MG/3ML
1 SOLUTION RESPIRATORY (INHALATION)
Status: DISCONTINUED | OUTPATIENT
Start: 2024-01-29 | End: 2024-01-29 | Stop reason: HOSPADM

## 2024-01-29 RX ORDER — HYDROMORPHONE HYDROCHLORIDE 1 MG/ML
0.5 INJECTION, SOLUTION INTRAMUSCULAR; INTRAVENOUS; SUBCUTANEOUS EVERY 5 MIN PRN
Status: DISCONTINUED | OUTPATIENT
Start: 2024-01-29 | End: 2024-01-29 | Stop reason: HOSPADM

## 2024-01-29 RX ORDER — SUCCINYLCHOLINE/SOD CL,ISO/PF 200MG/10ML
SYRINGE (ML) INTRAVENOUS PRN
Status: DISCONTINUED | OUTPATIENT
Start: 2024-01-29 | End: 2024-01-29 | Stop reason: SDUPTHER

## 2024-01-29 RX ORDER — OXYCODONE HYDROCHLORIDE AND ACETAMINOPHEN 5; 325 MG/1; MG/1
1 TABLET ORAL EVERY 6 HOURS PRN
Qty: 12 TABLET | Refills: 0 | Status: SHIPPED | OUTPATIENT
Start: 2024-01-29 | End: 2024-02-01

## 2024-01-29 RX ORDER — MEPERIDINE HYDROCHLORIDE 25 MG/ML
12.5 INJECTION INTRAMUSCULAR; INTRAVENOUS; SUBCUTANEOUS EVERY 5 MIN PRN
Status: DISCONTINUED | OUTPATIENT
Start: 2024-01-29 | End: 2024-01-29 | Stop reason: HOSPADM

## 2024-01-29 RX ORDER — CEFAZOLIN SODIUM 1 G/3ML
INJECTION, POWDER, FOR SOLUTION INTRAMUSCULAR; INTRAVENOUS
Status: DISCONTINUED
Start: 2024-01-29 | End: 2024-01-29 | Stop reason: HOSPADM

## 2024-01-29 RX ORDER — SODIUM CHLORIDE, SODIUM LACTATE, POTASSIUM CHLORIDE, CALCIUM CHLORIDE 600; 310; 30; 20 MG/100ML; MG/100ML; MG/100ML; MG/100ML
INJECTION, SOLUTION INTRAVENOUS CONTINUOUS
Status: CANCELLED | OUTPATIENT
Start: 2024-01-29

## 2024-01-29 RX ORDER — SODIUM CHLORIDE 9 MG/ML
INJECTION, SOLUTION INTRAVENOUS PRN
Status: DISCONTINUED | OUTPATIENT
Start: 2024-01-29 | End: 2024-01-29 | Stop reason: HOSPADM

## 2024-01-29 RX ORDER — DEXTROSE MONOHYDRATE 100 MG/ML
INJECTION, SOLUTION INTRAVENOUS CONTINUOUS PRN
Status: DISCONTINUED | OUTPATIENT
Start: 2024-01-29 | End: 2024-01-29 | Stop reason: HOSPADM

## 2024-01-29 RX ORDER — GLUCAGON 1 MG/ML
1 KIT INJECTION PRN
Status: DISCONTINUED | OUTPATIENT
Start: 2024-01-29 | End: 2024-01-29 | Stop reason: HOSPADM

## 2024-01-29 RX ORDER — METOCLOPRAMIDE HYDROCHLORIDE 5 MG/ML
10 INJECTION INTRAMUSCULAR; INTRAVENOUS
Status: DISCONTINUED | OUTPATIENT
Start: 2024-01-29 | End: 2024-01-29 | Stop reason: HOSPADM

## 2024-01-29 RX ORDER — FENTANYL CITRATE 50 UG/ML
INJECTION, SOLUTION INTRAMUSCULAR; INTRAVENOUS PRN
Status: DISCONTINUED | OUTPATIENT
Start: 2024-01-29 | End: 2024-01-29 | Stop reason: SDUPTHER

## 2024-01-29 RX ORDER — OXYCODONE HYDROCHLORIDE 5 MG/1
5 TABLET ORAL PRN
Status: DISCONTINUED | OUTPATIENT
Start: 2024-01-29 | End: 2024-01-29 | Stop reason: HOSPADM

## 2024-01-29 RX ORDER — ONDANSETRON 2 MG/ML
4 INJECTION INTRAMUSCULAR; INTRAVENOUS
Status: DISCONTINUED | OUTPATIENT
Start: 2024-01-29 | End: 2024-01-29 | Stop reason: HOSPADM

## 2024-01-29 RX ADMIN — MIDAZOLAM HYDROCHLORIDE 2 MG: 2 INJECTION, SOLUTION INTRAMUSCULAR; INTRAVENOUS at 14:47

## 2024-01-29 RX ADMIN — DEXMEDETOMIDINE 6 MCG: 100 INJECTION, SOLUTION INTRAVENOUS at 15:13

## 2024-01-29 RX ADMIN — LIDOCAINE HYDROCHLORIDE 100 MG: 20 INJECTION, SOLUTION EPIDURAL; INFILTRATION; INTRACAUDAL; PERINEURAL at 14:56

## 2024-01-29 RX ADMIN — KETOROLAC TROMETHAMINE 30 MG: 30 INJECTION, SOLUTION INTRAMUSCULAR at 16:06

## 2024-01-29 RX ADMIN — Medication 100 MCG: at 15:47

## 2024-01-29 RX ADMIN — DEXAMETHASONE SODIUM PHOSPHATE 4 MG: 4 INJECTION, SOLUTION INTRA-ARTICULAR; INTRALESIONAL; INTRAMUSCULAR; INTRAVENOUS; SOFT TISSUE at 15:08

## 2024-01-29 RX ADMIN — CEFAZOLIN SODIUM 2000 MG: 1 INJECTION, POWDER, FOR SOLUTION INTRAMUSCULAR; INTRAVENOUS at 14:47

## 2024-01-29 RX ADMIN — DEXMEDETOMIDINE 4 MCG: 100 INJECTION, SOLUTION INTRAVENOUS at 15:16

## 2024-01-29 RX ADMIN — ROCURONIUM BROMIDE 30 MG: 10 INJECTION, SOLUTION INTRAVENOUS at 15:06

## 2024-01-29 RX ADMIN — Medication 140 MG: at 14:56

## 2024-01-29 RX ADMIN — GLYCOPYRROLATE 0.2 MG: 0.2 INJECTION INTRAMUSCULAR; INTRAVENOUS at 14:47

## 2024-01-29 RX ADMIN — PROPOFOL 150 MG: 10 INJECTION, EMULSION INTRAVENOUS at 14:56

## 2024-01-29 RX ADMIN — ONDANSETRON 4 MG: 2 INJECTION INTRAMUSCULAR; INTRAVENOUS at 15:08

## 2024-01-29 RX ADMIN — SUGAMMADEX 200 MG: 100 INJECTION, SOLUTION INTRAVENOUS at 16:09

## 2024-01-29 RX ADMIN — SODIUM CHLORIDE, POTASSIUM CHLORIDE, SODIUM LACTATE AND CALCIUM CHLORIDE: 600; 310; 30; 20 INJECTION, SOLUTION INTRAVENOUS at 14:47

## 2024-01-29 RX ADMIN — FENTANYL CITRATE 100 MCG: 50 INJECTION, SOLUTION INTRAMUSCULAR; INTRAVENOUS at 14:56

## 2024-01-29 RX ADMIN — ROCURONIUM BROMIDE 10 MG: 10 INJECTION, SOLUTION INTRAVENOUS at 15:21

## 2024-01-29 RX ADMIN — ROCURONIUM BROMIDE 10 MG: 10 INJECTION, SOLUTION INTRAVENOUS at 15:55

## 2024-01-29 ASSESSMENT — PAIN - FUNCTIONAL ASSESSMENT
PAIN_FUNCTIONAL_ASSESSMENT: 0-10

## 2024-01-29 ASSESSMENT — PAIN SCALES - GENERAL
PAINLEVEL_OUTOF10: 0
PAINLEVEL_OUTOF10: 5

## 2024-01-29 NOTE — PROGRESS NOTES
Chief Complaint   Patient presents with    Follow-Up from Hospital    Nephrolithiasis        BP (!) 141/77   Pulse 71      PHQ-9 score is    Negative      1. \"Have you been to the ER, urgent care clinic since your last visit?  Hospitalized since your last visit?\" No    2. \"Have you seen or consulted any other health care providers outside of the [facility] since your last visit?\" No     3. For patients aged 45-75: Has the patient had a colonoscopy / FIT/ Cologuard? Yes - no Care Gap present      If the patient is female:    4. For patients aged 40-74: Has the patient had a mammogram within the past 2 years? NA - based on age or sex      5. For patients aged 21-65: Has the patient had a pap smear? NA - based on age or sex

## 2024-01-29 NOTE — PROGRESS NOTES
Iv dc'd site intact , no swelling noted , discharge instructions given to pt and pt's wife Orly , both verbalized understanding , no distress noted , pt assisted to br , voided

## 2024-01-29 NOTE — PROGRESS NOTES
HISTORY OF PRESENT ILLNESS  Yaw Delvalle Jr. is a 58 y.o. male     Yaw Delvalle Jr., 58 y.o. male with past medical history as listed and reviewed below presents for flank pain.  He started having acute left-sided flank pain that is waxing and waning associated with nausea similar to usual kidney stones.  He follows with me.  No dysuria or hematuria.  No fevers or chills.  No chest pain or shortness of breath.  No other complaints.         PAST MEDICAL HISTORY  PMHx (including negatives):  has a past medical history of Herpes zoster iritis of right eye, Kidney calculi, and Kidney stones.   PSurgHx:  has a past surgical history that includes Urological Surgery (04/28/2022); Urological Surgery (04/28/2022); Urological Surgery (04/28/2022); Urological Surgery (04/28/2022); Urological Surgery (05/02/2022); and back surgery (N/A, 10/31/2023).  PSocHx:  reports that he has never smoked. He has never used smokeless tobacco. He reports that he does not currently use alcohol. He reports that he does not use drugs.   FamilyHX:   Family History   Problem Relation Age of Onset    No Known Problems Father     No Known Problems Mother        ROS  Review of Systems  Negative: Positives and pertinent negatives as per HPI, otherwise negative ROS.     Physical Exam & Vital Signs       PHYSICAL EXAM  Physical Exam  Expand All Collapse All       [facility]  EMERGENCY DEPARTMENT ENCOUNTER NOTE     Date: 1/28/2024  Patient Name: Yaw Delvalle Jr.     History of Presenting Illness          Chief Complaint   Patient presents with    Flank Pain         History obtained from: Patient     HPI: Yaw Delvalle Jr., 58 y.o. male with past medical history as listed and reviewed below presents for flank pain.  He started having acute left-sided flank pain that is waxing and waning associated with nausea similar to usual kidney stones.  He follows with urology.  No dysuria or hematuria.  No fevers or chills.  No

## 2024-01-29 NOTE — OP NOTE
Operative Note      Patient: Yaw Delvalle Jr.  YOB: 1965  MRN: [id number]    Date of Procedure: 1/29/2024    Pre-Op Diagnosis Codes:     * Ureteral calculus [N20.1]  Left kidney stone  Post-Op Diagnosis: Same       Procedure(s):  CYSTOSCOPY URETEROSCOPY LASER ON LEFT, STENT PLACEMENT ON LEFT, BASKET EXTRACTION OF STONES    Surgeon(s):  Jorge Ordonez MD    Assistant:   * No surgical staff found *    Anesthesia: General    Estimated Blood Loss (mL): Minimal    Complications: None    Specimens:   ID Type Source Tests Collected by Time Destination   A : kidney stones Tissue Kidney STONE ANALYSIS Jorge Ordonez MD 1/29/2024 1606        Implants:  Implant Name Type Inv. Item Serial No.  Lot No. LRB No. Used Action   STENT URET 7FR [id number] PERCFLX HYDR+ SFT PGTL TAPR TIP - SNA  STENT URET 7FR [id number] PERCFLX HYDR+ SFT PGTL TAPR TIP NA [employer]-WD [id number] Left 1 Implanted         Drains: * No LDAs found *    Findings: Multiple areas were constricted but passable with the scope, larger stones in the kidney about 6 mm additional small stones were seen as well.  6 mm stone in the lower collecting system        Detailed Description of Procedure:   Patient was prepped and draped in usual sterile fashion after undergoing general anesthesia and placed in lithotomy position.  Timeout was performed.  Preoperative antibiotics were given.  SCDs were applied  Patient was scope with the semiflex rigid ureteroscope.  It was passed to the left ureteral orifice without difficulty through the penis.  I then followed Glidewire up the left ureteral orifice.  The multiple areas of a little constricted may be in spasm and I was able to get all the way to the UPJ but there is no stone in the ureter.  I then removed the ureteroscope left in the wire.  I then introduced a 12/14 ureteral dilator kit 35 cm.  Put in a safety wire.  Then put the ureteroscope/flexible up the sheath into the kidney.  I

## 2024-01-29 NOTE — ANESTHESIA PRE PROCEDURE
Department of Anesthesiology  Preprocedure Note       Name:  Yaw Delvalle Jr.   Age:  58 y.o.  :  1965                                          MRN:  [id number]         Date:  2024      Surgeon: Surgeon(s):  Jorge Ordonez MD    Procedure: Procedure(s):  CYSTOSCOPY URETEROSCOPY LASER ON LEFT, POSSIBLE STENT PLACEMENT ON LEFT    Medications prior to admission:   Prior to Admission medications    Medication Sig Start Date End Date Taking? Authorizing Provider   tamsulosin (FLOMAX) 0.4 MG capsule Take 1 capsule by mouth daily for 7 days 24  Cong Carlin MD   ketorolac (TORADOL) 10 MG tablet Take 1 tablet by mouth every 6 hours as needed for Pain 24   Cong Carlin MD   ondansetron (ZOFRAN-ODT) 4 MG disintegrating tablet Take 1 tablet by mouth every 8 hours as needed for Nausea or Vomiting 24   Cong Carlin MD   acetaminophen (AMINOFEN) 325 MG tablet Take 2 tablets by mouth every 6 hours as needed for Pain (mild pain) 10/31/23   Shira Davis MD   ibuprofen (ADVIL) 200 MG tablet Take 2 tablets by mouth every 6 hours as needed for Pain (moderate pain) 10/31/23   Shira Davis MD   erythromycin (ROMYCIN) 5 MG/GM ophthalmic ointment  22   Automatic Reconciliation, Ar       Current medications:    No current facility-administered medications for this encounter.     Current Outpatient Medications   Medication Sig Dispense Refill   • tamsulosin (FLOMAX) 0.4 MG capsule Take 1 capsule by mouth daily for 7 days 7 capsule 0   • ketorolac (TORADOL) 10 MG tablet Take 1 tablet by mouth every 6 hours as needed for Pain 20 tablet 0   • ondansetron (ZOFRAN-ODT) 4 MG disintegrating tablet Take 1 tablet by mouth every 8 hours as needed for Nausea or Vomiting 20 tablet 0   • acetaminophen (AMINOFEN) 325 MG tablet Take 2 tablets by mouth every 6 hours as needed for Pain (mild pain) 120 tablet 3   • ibuprofen (ADVIL) 200 MG tablet Take 2 tablets by mouth every 6 hours as needed for Pain  Leep-gyn  Date/Time: 2018 12:05 PM  Performed by: SHYLA ALEXSI  Authorized by: SHYLA ALEXIS   Preparation: Patient was prepped and draped in the usual sterile fashion.  Local anesthesia used: yes  Anesthesia: local infiltration    Anesthesia:  Local anesthesia used: yes  Local Anesthetic: lidocaine 2% with epinephrine  Anesthetic total: 6 mL    Sedation:  Patient sedated: no  Patient tolerance: Patient tolerated the procedure well with no immediate complications  Comments: Gayla Montana is a 21 y.o. female  presents for a LEEP procedure.      DATA REVIEWED:  Last Pap Result: HSIL  Last Colposcopy Result: MITZI 2-3 with negative ECC     PRE- LEEP PROCEDURE COUNSELING:  Risks of infection, bleeding, pain, injury to adjacent organs as well as future cervical incompetence.  That this procedure does not guarantee to completely remove all abnormal cells and that dysplasia may reoccur in the future.  Alternatives to the LEEP procedure were discussed and the patient agrees to proceed..    PROCEDURE:  TIME OUT PERFORMED.  The cervix and transformation zone were visualized with a speculum and a local block was obtained with 6 mL 2% Lidocaine with epinephrine.  A  loop was selected.  The entire transformation zone was excised.  Post-LEEP ECC was performed.  The base was cauterized with a ball cautery.  Monsels solution was applied to the base to obtain hemostasis and the speculum removed.  The specimen was placed in formalin and sent to Pathology for a Histopathologic diagnosis.    POST LEEP PROCEDURE COUNSELING:  Manage post LEEP cramping with NSAIDs or Tylenol.  Avoid anything in vagina (intercourse, douching, tampons) two weeks after the Procedure.  Expect a watery grey to yellow vaginal discharge for several weeks.  Limit activity for 2 weeks.  Report bleeding heavier than a period, worsening pain, fever > 101.0 F, or foul-smelling vaginal discharge.  Importance of follow-up  stressed.    Counseling lasted approximately 15 minutes and all her questions were answered.    FOLLOW-UP: in 4 weeks

## 2024-01-29 NOTE — DISCHARGE INSTRUCTIONS
UROLOGY  [phone]    Toradol (NSAID) given at 4 PM.     Take stool softener when taking narcotics

## 2024-01-29 NOTE — ANESTHESIA POSTPROCEDURE EVALUATION
Department of Anesthesiology  Postprocedure Note    Patient: Yaw Delvalle Jr.  MRN: [id number]  YOB: 1965  Date of evaluation: 1/29/2024    Procedure Summary       Date: 01/29/24 Room / Location: [facility]ST[facility] / [facility] MAIN OR    Anesthesia Start: 1447 Anesthesia Stop: 1622    Procedure: CYSTOSCOPY URETEROSCOPY LASER ON LEFT, STENT PLACEMENT ON LEFT, BASKET EXTRACTION OF STONES (Left: Bladder) Diagnosis:       Ureteral calculus      (Ureteral calculus [N20.1])    Surgeons: Jorge Ordonez MD Responsible Provider: Ashwin Lynn MD    Anesthesia Type: General ASA Status: 2 - Emergent            Anesthesia Type: General    Jose F Phase I: Jose F Score: 9    Jose F Phase II:      Anesthesia Post Evaluation    Patient location during evaluation: PACU  Patient participation: complete - patient participated  Level of consciousness: sleepy but conscious  Pain score: 0  Airway patency: patent  Nausea & Vomiting: no nausea and no vomiting  Cardiovascular status: hemodynamically stable  Respiratory status: acceptable  Hydration status: stable  Multimodal analgesia pain management approach    No notable events documented.

## 2024-01-30 ENCOUNTER — TELEPHONE (OUTPATIENT)
Age: 59
End: 2024-01-30

## 2024-01-30 NOTE — TELEPHONE ENCOUNTER
Pts wife contacted the office with questions after surgery yesterday - wanted to know if he should continue flomax (yes till follow up) and if he should take the toradol or the percocet ( he should stop toradol)

## 2024-01-31 LAB
BACTERIA UR CULT: NORMAL
SPECIMEN STATUS REPORT: NORMAL

## 2024-02-01 ENCOUNTER — PREP FOR PROCEDURE (OUTPATIENT)
Age: 59
End: 2024-02-01

## 2024-02-01 ENCOUNTER — OFFICE VISIT (OUTPATIENT)
Age: 59
End: 2024-02-01
Payer: COMMERCIAL

## 2024-02-01 ENCOUNTER — TELEPHONE (OUTPATIENT)
Age: 59
End: 2024-02-01

## 2024-02-01 ENCOUNTER — HOSPITAL ENCOUNTER (OUTPATIENT)
Facility: HOSPITAL | Age: 59
Discharge: HOME | End: 2024-02-01
Payer: COMMERCIAL

## 2024-02-01 VITALS — WEIGHT: 210 LBS | BODY MASS INDEX: 30.06 KG/M2 | HEIGHT: 70 IN

## 2024-02-01 DIAGNOSIS — N20.0 KIDNEY STONES: ICD-10-CM

## 2024-02-01 DIAGNOSIS — R10.9 RIGHT FLANK PAIN: ICD-10-CM

## 2024-02-01 DIAGNOSIS — Z96.0 RETAINED URETERAL STENT: ICD-10-CM

## 2024-02-01 DIAGNOSIS — N20.0 CALCULUS OF KIDNEY: ICD-10-CM

## 2024-02-01 DIAGNOSIS — N20.0 KIDNEY STONES: Primary | ICD-10-CM

## 2024-02-01 DIAGNOSIS — N20.1 URETERAL STONE: Primary | ICD-10-CM

## 2024-02-01 DIAGNOSIS — N20.1 RIGHT URETERAL STONE: ICD-10-CM

## 2024-02-01 LAB
BILIRUBIN, URINE, POC: NORMAL
BLOOD URINE, POC: NORMAL
GLUCOSE URINE, POC: NEGATIVE
KETONES, URINE, POC: NEGATIVE
LEUKOCYTE ESTERASE, URINE, POC: NORMAL
NITRITE, URINE, POC: NEGATIVE
PH, URINE, POC: 6 (ref 4.6–8)
PROTEIN,URINE, POC: 300
SPECIFIC GRAVITY, URINE, POC: 1.03 (ref 1–1.03)
URINALYSIS CLARITY, POC: NORMAL
URINALYSIS COLOR, POC: YELLOW
UROBILINOGEN, POC: NORMAL

## 2024-02-01 PROCEDURE — 99214 OFFICE O/P EST MOD 30 MIN: CPT | Performed by: UROLOGY

## 2024-02-01 PROCEDURE — 74018 RADEX ABDOMEN 1 VIEW: CPT

## 2024-02-01 PROCEDURE — 81003 URINALYSIS AUTO W/O SCOPE: CPT | Performed by: UROLOGY

## 2024-02-01 ASSESSMENT — ENCOUNTER SYMPTOMS: BACK PAIN: 1

## 2024-02-01 NOTE — PROGRESS NOTES
HISTORY OF PRESENT ILLNESS  Yaw Delvalle Jr. is a 58 y.o. male     Patient came in today with severe right flank pain.  On the left side he is doing fine he has a stent in place not bothering him that much.  But the right flank pain severe like the left kidney stone loss.  He has 2 stones in the CT scan the right side about 3 to 4 mm in size on the least CT.  He had a KUB today that did not show anything on the right side that I could see.  Patient is scheduled for cystoscopy and stent removal on Monday.  I will ureteroscope his right side and get rid of the stones in his kidney if I can in ureter.  He is aware the risk of bleeding infection injury kidney ureter vascular injury pulm embolus death we will place a stent on the right side at that time      PAST MEDICAL HISTORY  PMHx (including negatives):  has a past medical history of Herpes zoster iritis of right eye, Kidney calculi, and Kidney stones.   PSurgHx:  has a past surgical history that includes Urological Surgery (04/28/2022); Urological Surgery (04/28/2022); Urological Surgery (04/28/2022); Urological Surgery (04/28/2022); Urological Surgery (05/02/2022); back surgery (N/A, 10/31/2023); and Cystoscopy (Left, 1/29/2024).  PSocHx:  reports that he has never smoked. He has never used smokeless tobacco. He reports that he does not currently use alcohol. He reports that he does not use drugs.   FamilyHX:   Family History   Problem Relation Age of Onset    No Known Problems Mother     No Known Problems Father        ROS  Review of Systems   Genitourinary:  Positive for flank pain and frequency.   Musculoskeletal:  Positive for back pain.   All other systems reviewed and are negative.        PHYSICAL EXAM  Physical Exam  Expand All Collapse All    HISTORY OF PRESENT ILLNESS  Yaw Delvalle Jr. is a 58 y.o. male      Yaw Delvalle Jr., 58 y.o. male with past medical history as listed and reviewed below presents for flank pain.  He started having acute left-sided

## 2024-02-01 NOTE — TELEPHONE ENCOUNTER
Patient wife called     Patient feel a kidney stone is moving on the right side and he is still bleeding a lot from the procedure.

## 2024-02-02 ENCOUNTER — ANESTHESIA (OUTPATIENT)
Facility: HOSPITAL | Age: 59
End: 2024-02-02
Payer: COMMERCIAL

## 2024-02-02 ENCOUNTER — ANESTHESIA EVENT (OUTPATIENT)
Facility: HOSPITAL | Age: 59
End: 2024-02-02
Payer: COMMERCIAL

## 2024-02-02 ENCOUNTER — HOSPITAL ENCOUNTER (OUTPATIENT)
Facility: HOSPITAL | Age: 59
Discharge: HOME | End: 2024-02-02
Attending: UROLOGY | Admitting: UROLOGY
Payer: COMMERCIAL

## 2024-02-02 ENCOUNTER — APPOINTMENT (OUTPATIENT)
Facility: HOSPITAL | Age: 59
End: 2024-02-02
Attending: UROLOGY
Payer: COMMERCIAL

## 2024-02-02 VITALS
WEIGHT: 210 LBS | SYSTOLIC BLOOD PRESSURE: 128 MMHG | HEIGHT: 70 IN | DIASTOLIC BLOOD PRESSURE: 79 MMHG | BODY MASS INDEX: 30.06 KG/M2 | OXYGEN SATURATION: 94 % | RESPIRATION RATE: 18 BRPM | HEART RATE: 63 BPM | TEMPERATURE: 97.7 F

## 2024-02-02 DIAGNOSIS — N23 RENAL COLIC ON RIGHT SIDE: Primary | ICD-10-CM

## 2024-02-02 PROBLEM — N20.1 RIGHT URETERAL STONE: Status: RESOLVED | Noted: 2024-02-01 | Resolved: 2024-02-02

## 2024-02-02 PROBLEM — N20.1 CALCULUS OF URETER: Status: RESOLVED | Noted: 2024-02-02 | Resolved: 2024-02-02

## 2024-02-02 PROBLEM — N20.1 CALCULUS OF URETER: Status: ACTIVE | Noted: 2024-02-02

## 2024-02-02 LAB
APPEARANCE UR: ABNORMAL
BACTERIA #/AREA URNS HPF: ABNORMAL /[HPF]
BILIRUB UR QL STRIP: NEGATIVE
CASTS URNS QL MICRO: ABNORMAL /LPF
COLOR UR: ABNORMAL
EPI CELLS #/AREA URNS HPF: ABNORMAL /HPF (ref 0–10)
GLUCOSE UR QL STRIP: NEGATIVE
HGB UR QL STRIP: ABNORMAL
KETONES UR QL STRIP: NEGATIVE
LEUKOCYTE ESTERASE UR QL STRIP: ABNORMAL
MICRO URNS: ABNORMAL
NITRITE UR QL STRIP: NEGATIVE
PH UR STRIP: 5.5 [PH] (ref 5–7.5)
PROT UR QL STRIP: ABNORMAL
RBC #/AREA URNS HPF: >30 /HPF (ref 0–2)
SP GR UR STRIP: 1.02 (ref 1–1.03)
UROBILINOGEN UR STRIP-MCNC: 0.2 MG/DL (ref 0.2–1)
WBC #/AREA URNS HPF: >30 /HPF (ref 0–5)

## 2024-02-02 PROCEDURE — 7100000010 HC PHASE II RECOVERY - FIRST 15 MIN: Performed by: UROLOGY

## 2024-02-02 PROCEDURE — 3600000014 HC SURGERY LEVEL 4 ADDTL 15MIN: Performed by: UROLOGY

## 2024-02-02 PROCEDURE — 3700000001 HC ADD 15 MINUTES (ANESTHESIA): Performed by: UROLOGY

## 2024-02-02 PROCEDURE — 6360000002 HC RX W HCPCS: Performed by: UROLOGY

## 2024-02-02 PROCEDURE — C2617 STENT, NON-COR, TEM W/O DEL: HCPCS | Performed by: UROLOGY

## 2024-02-02 PROCEDURE — 3600000004 HC SURGERY LEVEL 4 BASE: Performed by: UROLOGY

## 2024-02-02 PROCEDURE — 2580000003 HC RX 258: Performed by: UROLOGY

## 2024-02-02 PROCEDURE — 82360 CALCULUS ASSAY QUANT: CPT

## 2024-02-02 PROCEDURE — 6370000000 HC RX 637 (ALT 250 FOR IP): Performed by: ANESTHESIOLOGY

## 2024-02-02 PROCEDURE — C1769 GUIDE WIRE: HCPCS | Performed by: UROLOGY

## 2024-02-02 PROCEDURE — 6370000000 HC RX 637 (ALT 250 FOR IP): Performed by: UROLOGY

## 2024-02-02 PROCEDURE — 52356 CYSTO/URETERO W/LITHOTRIPSY: CPT | Performed by: UROLOGY

## 2024-02-02 PROCEDURE — 2709999900 HC NON-CHARGEABLE SUPPLY: Performed by: UROLOGY

## 2024-02-02 PROCEDURE — 2720000010 HC SURG SUPPLY STERILE: Performed by: UROLOGY

## 2024-02-02 PROCEDURE — 76000 FLUOROSCOPY <1 HR PHYS/QHP: CPT

## 2024-02-02 PROCEDURE — 7100000000 HC PACU RECOVERY - FIRST 15 MIN: Performed by: UROLOGY

## 2024-02-02 PROCEDURE — 7100000011 HC PHASE II RECOVERY - ADDTL 15 MIN: Performed by: UROLOGY

## 2024-02-02 PROCEDURE — C1894 INTRO/SHEATH, NON-LASER: HCPCS | Performed by: UROLOGY

## 2024-02-02 PROCEDURE — 3700000000 HC ANESTHESIA ATTENDED CARE: Performed by: UROLOGY

## 2024-02-02 PROCEDURE — 7100000001 HC PACU RECOVERY - ADDTL 15 MIN: Performed by: UROLOGY

## 2024-02-02 PROCEDURE — 6360000002 HC RX W HCPCS: Performed by: NURSE ANESTHETIST, CERTIFIED REGISTERED

## 2024-02-02 PROCEDURE — 2500000003 HC RX 250 WO HCPCS: Performed by: NURSE ANESTHETIST, CERTIFIED REGISTERED

## 2024-02-02 DEVICE — VARIABLE LENGTH URETERAL STENT
Type: IMPLANTABLE DEVICE | Site: URETER | Status: FUNCTIONAL
Brand: CONTOUR VL™

## 2024-02-02 RX ORDER — OXYCODONE HYDROCHLORIDE 5 MG/1
5 TABLET ORAL EVERY 6 HOURS PRN
Qty: 12 TABLET | Refills: 0 | Status: SHIPPED | OUTPATIENT
Start: 2024-02-02 | End: 2024-02-05

## 2024-02-02 RX ORDER — LIDOCAINE HYDROCHLORIDE 20 MG/ML
INJECTION, SOLUTION INTRAVENOUS PRN
Status: DISCONTINUED | OUTPATIENT
Start: 2024-02-02 | End: 2024-02-02 | Stop reason: SDUPTHER

## 2024-02-02 RX ORDER — SODIUM CHLORIDE 9 MG/ML
INJECTION, SOLUTION INTRAVENOUS PRN
Status: DISCONTINUED | OUTPATIENT
Start: 2024-02-02 | End: 2024-02-02 | Stop reason: HOSPADM

## 2024-02-02 RX ORDER — SODIUM CHLORIDE 0.9 % (FLUSH) 0.9 %
5-40 SYRINGE (ML) INJECTION PRN
Status: DISCONTINUED | OUTPATIENT
Start: 2024-02-02 | End: 2024-02-02 | Stop reason: HOSPADM

## 2024-02-02 RX ORDER — LIDOCAINE HYDROCHLORIDE 20 MG/ML
JELLY TOPICAL PRN
Status: DISCONTINUED | OUTPATIENT
Start: 2024-02-02 | End: 2024-02-02 | Stop reason: ALTCHOICE

## 2024-02-02 RX ORDER — SODIUM CHLORIDE, SODIUM LACTATE, POTASSIUM CHLORIDE, CALCIUM CHLORIDE 600; 310; 30; 20 MG/100ML; MG/100ML; MG/100ML; MG/100ML
INJECTION, SOLUTION INTRAVENOUS CONTINUOUS
Status: DISCONTINUED | OUTPATIENT
Start: 2024-02-02 | End: 2024-02-02 | Stop reason: HOSPADM

## 2024-02-02 RX ORDER — ONDANSETRON 2 MG/ML
4 INJECTION INTRAMUSCULAR; INTRAVENOUS
Status: DISCONTINUED | OUTPATIENT
Start: 2024-02-02 | End: 2024-02-02 | Stop reason: HOSPADM

## 2024-02-02 RX ORDER — IPRATROPIUM BROMIDE AND ALBUTEROL SULFATE 2.5; .5 MG/3ML; MG/3ML
1 SOLUTION RESPIRATORY (INHALATION)
Status: DISCONTINUED | OUTPATIENT
Start: 2024-02-02 | End: 2024-02-02 | Stop reason: HOSPADM

## 2024-02-02 RX ORDER — FENTANYL CITRATE 50 UG/ML
50 INJECTION, SOLUTION INTRAMUSCULAR; INTRAVENOUS EVERY 5 MIN PRN
Status: DISCONTINUED | OUTPATIENT
Start: 2024-02-02 | End: 2024-02-02 | Stop reason: HOSPADM

## 2024-02-02 RX ORDER — MIDAZOLAM HYDROCHLORIDE 1 MG/ML
INJECTION INTRAMUSCULAR; INTRAVENOUS PRN
Status: DISCONTINUED | OUTPATIENT
Start: 2024-02-02 | End: 2024-02-02 | Stop reason: SDUPTHER

## 2024-02-02 RX ORDER — CEFAZOLIN SODIUM 1 G/3ML
INJECTION, POWDER, FOR SOLUTION INTRAMUSCULAR; INTRAVENOUS
Status: DISPENSED
Start: 2024-02-02 | End: 2024-02-02

## 2024-02-02 RX ORDER — PROPOFOL 10 MG/ML
INJECTION, EMULSION INTRAVENOUS PRN
Status: DISCONTINUED | OUTPATIENT
Start: 2024-02-02 | End: 2024-02-02 | Stop reason: SDUPTHER

## 2024-02-02 RX ORDER — OXYCODONE HYDROCHLORIDE 5 MG/1
5 TABLET ORAL PRN
Status: COMPLETED | OUTPATIENT
Start: 2024-02-02 | End: 2024-02-02

## 2024-02-02 RX ORDER — METOCLOPRAMIDE HYDROCHLORIDE 5 MG/ML
10 INJECTION INTRAMUSCULAR; INTRAVENOUS
Status: DISCONTINUED | OUTPATIENT
Start: 2024-02-02 | End: 2024-02-02 | Stop reason: HOSPADM

## 2024-02-02 RX ORDER — DEXMEDETOMIDINE HYDROCHLORIDE 100 UG/ML
INJECTION, SOLUTION INTRAVENOUS PRN
Status: DISCONTINUED | OUTPATIENT
Start: 2024-02-02 | End: 2024-02-02 | Stop reason: SDUPTHER

## 2024-02-02 RX ORDER — DIPHENHYDRAMINE HYDROCHLORIDE 50 MG/ML
12.5 INJECTION INTRAMUSCULAR; INTRAVENOUS
Status: DISCONTINUED | OUTPATIENT
Start: 2024-02-02 | End: 2024-02-02 | Stop reason: HOSPADM

## 2024-02-02 RX ORDER — SODIUM CHLORIDE, SODIUM LACTATE, POTASSIUM CHLORIDE, CALCIUM CHLORIDE 600; 310; 30; 20 MG/100ML; MG/100ML; MG/100ML; MG/100ML
INJECTION, SOLUTION INTRAVENOUS ONCE
Status: DISCONTINUED | OUTPATIENT
Start: 2024-02-02 | End: 2024-02-02 | Stop reason: HOSPADM

## 2024-02-02 RX ORDER — OXYCODONE HYDROCHLORIDE 5 MG/1
10 TABLET ORAL PRN
Status: COMPLETED | OUTPATIENT
Start: 2024-02-02 | End: 2024-02-02

## 2024-02-02 RX ORDER — LABETALOL HYDROCHLORIDE 5 MG/ML
10 INJECTION, SOLUTION INTRAVENOUS
Status: DISCONTINUED | OUTPATIENT
Start: 2024-02-02 | End: 2024-02-02 | Stop reason: HOSPADM

## 2024-02-02 RX ORDER — SODIUM CHLORIDE 0.9 % (FLUSH) 0.9 %
5-40 SYRINGE (ML) INJECTION EVERY 12 HOURS SCHEDULED
Status: DISCONTINUED | OUTPATIENT
Start: 2024-02-02 | End: 2024-02-02 | Stop reason: HOSPADM

## 2024-02-02 RX ORDER — KETOROLAC TROMETHAMINE 30 MG/ML
INJECTION, SOLUTION INTRAMUSCULAR; INTRAVENOUS PRN
Status: DISCONTINUED | OUTPATIENT
Start: 2024-02-02 | End: 2024-02-02 | Stop reason: SDUPTHER

## 2024-02-02 RX ORDER — FENTANYL CITRATE 50 UG/ML
INJECTION, SOLUTION INTRAMUSCULAR; INTRAVENOUS PRN
Status: DISCONTINUED | OUTPATIENT
Start: 2024-02-02 | End: 2024-02-02 | Stop reason: SDUPTHER

## 2024-02-02 RX ORDER — HYDRALAZINE HYDROCHLORIDE 20 MG/ML
10 INJECTION INTRAMUSCULAR; INTRAVENOUS
Status: DISCONTINUED | OUTPATIENT
Start: 2024-02-02 | End: 2024-02-02 | Stop reason: HOSPADM

## 2024-02-02 RX ORDER — GLUCAGON 1 MG/ML
1 KIT INJECTION PRN
Status: DISCONTINUED | OUTPATIENT
Start: 2024-02-02 | End: 2024-02-02 | Stop reason: HOSPADM

## 2024-02-02 RX ORDER — DEXTROSE MONOHYDRATE 100 MG/ML
INJECTION, SOLUTION INTRAVENOUS CONTINUOUS PRN
Status: DISCONTINUED | OUTPATIENT
Start: 2024-02-02 | End: 2024-02-02 | Stop reason: HOSPADM

## 2024-02-02 RX ORDER — ONDANSETRON 2 MG/ML
INJECTION INTRAMUSCULAR; INTRAVENOUS PRN
Status: DISCONTINUED | OUTPATIENT
Start: 2024-02-02 | End: 2024-02-02 | Stop reason: SDUPTHER

## 2024-02-02 RX ADMIN — OXYCODONE 10 MG: 5 TABLET ORAL at 14:57

## 2024-02-02 RX ADMIN — FENTANYL CITRATE 50 MCG: 50 INJECTION, SOLUTION INTRAMUSCULAR; INTRAVENOUS at 13:24

## 2024-02-02 RX ADMIN — LIDOCAINE HYDROCHLORIDE 100 MG: 20 INJECTION, SOLUTION INTRAVENOUS at 12:37

## 2024-02-02 RX ADMIN — FENTANYL CITRATE 50 MCG: 50 INJECTION, SOLUTION INTRAMUSCULAR; INTRAVENOUS at 12:52

## 2024-02-02 RX ADMIN — MIDAZOLAM HYDROCHLORIDE 2 MG: 2 INJECTION, SOLUTION INTRAMUSCULAR; INTRAVENOUS at 12:33

## 2024-02-02 RX ADMIN — DEXMEDETOMIDINE 6 MCG: 100 INJECTION, SOLUTION INTRAVENOUS at 13:38

## 2024-02-02 RX ADMIN — FENTANYL CITRATE 100 MCG: 50 INJECTION, SOLUTION INTRAMUSCULAR; INTRAVENOUS at 12:37

## 2024-02-02 RX ADMIN — CEFAZOLIN SODIUM 2000 MG: 1 INJECTION, POWDER, FOR SOLUTION INTRAMUSCULAR; INTRAVENOUS at 12:27

## 2024-02-02 RX ADMIN — ONDANSETRON 4 MG: 2 INJECTION INTRAMUSCULAR; INTRAVENOUS at 13:29

## 2024-02-02 RX ADMIN — PROPOFOL 200 MG: 10 INJECTION, EMULSION INTRAVENOUS at 12:37

## 2024-02-02 RX ADMIN — KETOROLAC TROMETHAMINE 15 MG: 30 INJECTION, SOLUTION INTRAMUSCULAR at 13:49

## 2024-02-02 RX ADMIN — SODIUM CHLORIDE, POTASSIUM CHLORIDE, SODIUM LACTATE AND CALCIUM CHLORIDE: 600; 310; 30; 20 INJECTION, SOLUTION INTRAVENOUS at 11:27

## 2024-02-02 RX ADMIN — SODIUM CHLORIDE, POTASSIUM CHLORIDE, SODIUM LACTATE AND CALCIUM CHLORIDE: 600; 310; 30; 20 INJECTION, SOLUTION INTRAVENOUS at 13:54

## 2024-02-02 ASSESSMENT — PAIN DESCRIPTION - DESCRIPTORS
DESCRIPTORS: SHARP;SHOOTING;STABBING
DESCRIPTORS: SHARP;SHOOTING;STABBING

## 2024-02-02 ASSESSMENT — PAIN - FUNCTIONAL ASSESSMENT
PAIN_FUNCTIONAL_ASSESSMENT: 0-10
PAIN_FUNCTIONAL_ASSESSMENT: FACE, LEGS, ACTIVITY, CRY, AND CONSOLABILITY (FLACC)
PAIN_FUNCTIONAL_ASSESSMENT: 0-10
PAIN_FUNCTIONAL_ASSESSMENT: 0-10

## 2024-02-02 ASSESSMENT — PAIN DESCRIPTION - LOCATION: LOCATION: PENIS

## 2024-02-02 ASSESSMENT — PAIN SCALES - GENERAL: PAINLEVEL_OUTOF10: 7

## 2024-02-02 NOTE — ANESTHESIA POSTPROCEDURE EVALUATION
Department of Anesthesiology  Postprocedure Note    Patient: Yaw Delvalle Jr.  MRN: [id number]  YOB: 1965  Date of evaluation: 2/2/2024    Procedure Summary       Date: 02/02/24 Room / Location: [facility] MAIN CYSTO / [facility] MAIN OR    Anesthesia Start: 1233 Anesthesia Stop: 1407    Procedure: CYSTOURETHROSCOPY, RIGHT URETEROSCOPY, LASER LITHOTRIPSY, RIGHT URETERAL STENT PLACEMENT WITH LEFT URETERAL STENT REMOVAL (Bilateral: Ureter) Diagnosis:       Right ureteral stone      (Right ureteral stone [N20.1])    Surgeons: Jorge Ordonez MD Responsible Provider: Lovely Browne MD    Anesthesia Type: General ASA Status: 2            Anesthesia Type: General    Jose F Phase I: Jose F Score: 8    Jose F Phase II:      Anesthesia Post Evaluation    Patient location during evaluation: PACU  Patient participation: complete - patient participated  Level of consciousness: awake  Airway patency: patent  Nausea & Vomiting: no nausea and no vomiting  Cardiovascular status: hemodynamically stable  Respiratory status: acceptable  Hydration status: euvolemic  Pain management: adequate    No notable events documented.

## 2024-02-02 NOTE — PERIOP NOTE
Patient alert and oriented x4, VS stable, 2/10 pain at time of discharge. Discharge instructions/education provided to wife, Orly, she verbalized understanding and had no questions. Patient discharged in wheelchair at main entrance of hospital to home with wife via private vehicle.

## 2024-02-02 NOTE — ANESTHESIA PRE PROCEDURE
Department of Anesthesiology  Preprocedure Note       Name:  Yaw Delvalle Jr.   Age:  58 y.o.  :  1965                                          MRN:  [id number]         Date:  2024      Surgeon: Surgeon(s):  Jorge Ordonez MD    Procedure: Procedure(s):  CYSTOURETHROSCOPY, RIGHT URETEROSCOPY, LASER LITHOTRIPSY, RIGHT URETERAL STENT PLACEMENT WITH LEFT URETERAL STENT REMOVAL    Medications prior to admission:   Prior to Admission medications    Medication Sig Start Date End Date Taking? Authorizing Provider   sulfamethoxazole-trimethoprim (BACTRIM DS;SEPTRA DS) 800-160 MG per tablet Take 1 tablet by mouth 2 times daily for 5 days 1/29/24 2/3/24  Jorge Ordonez MD   Phenazopyridine HCl (AZO URINARY PAIN RELIEF) 99.5 MG TABS Take 1 tablet by mouth 3 times daily as needed (burning)  Patient not taking: Reported on 2024  Jorge Ordonez MD   tamsulosin (FLOMAX) 0.4 MG capsule Take 1 capsule by mouth daily for 7 days 24  Cong Carlin MD   ketorolac (TORADOL) 10 MG tablet Take 1 tablet by mouth every 6 hours as needed for Pain 24   Cong Carlin MD   ondansetron (ZOFRAN-ODT) 4 MG disintegrating tablet Take 1 tablet by mouth every 8 hours as needed for Nausea or Vomiting  Patient not taking: Reported on 2024   Cong Carlin MD   acetaminophen (AMINOFEN) 325 MG tablet Take 2 tablets by mouth every 6 hours as needed for Pain (mild pain) 10/31/23   Shira Davis MD   ibuprofen (ADVIL) 200 MG tablet Take 2 tablets by mouth every 6 hours as needed for Pain (moderate pain)  Patient not taking: Reported on 2024 10/31/23   Shira Davis MD   erythromycin (ROMYCIN) 5 MG/GM ophthalmic ointment  22   Automatic Reconciliation, Ar       Current medications:    Current Facility-Administered Medications   Medication Dose Route Frequency Provider Last Rate Last Admin    lactated ringers IV soln infusion   IntraVENous Continuous Jorge Ordonez MD 20

## 2024-02-02 NOTE — OP NOTE
Operative Note      Patient: Yaw Delvalle Jr.  YOB: 1965  MRN: [id number]    Date of Procedure: 2/2/2024    Pre-Op Diagnosis Codes:     * Right ureteral stone [N20.1]  Retained left ureteral stent  Post-Op Diagnosis: Same       Procedure(s):  CYSTOURETHROSCOPY, RIGHT URETEROSCOPY, LASER LITHOTRIPSY, RIGHT URETERAL STENT PLACEMENT WITH LEFT URETERAL STENT REMOVAL    Surgeon(s):  Jorge Ordonez MD    Assistant:   * No surgical staff found *    Anesthesia: General    Estimated Blood Loss (mL): Minimal    Complications: None    Specimens:   ID Type Source Tests Collected by Time Destination   A : RIGHT RENAL STONE Stone (Calculus) Kidney STONE ANALYSIS Jorge Ordonez MD 2/2/2024 1320        Implants:  Implant Name Type Inv. Item Serial No.  Lot No. LRB No. Used Action   STENT URET 7FR [id number] PERCFLX HYDR+ SFT PGTL TAPR TIP - [id number] Stent STENT URET 7FR [id number] PERCFLX HYDR+ SFT PGTL TAPR TIP  [employer] UROLOGY- [id number] Right 1 Implanted         Drains:   Ureteral Drain/Stent 02/02/24 Right Ureter (Active)       Findings:   4 mm stone in the right kidney ureter was clear  Stent projecting from the left ureteral orifice      Detailed Description of Procedure:   Patient was prepped and draped in usual sterile fashion after undergoing anesthesia placed lithotomy position SCDs were applied preoperative antibiotics were given, timeout was performed  CYSTOSCOPY/ STENT REMOVAL  Patient presented for cystoscopy and ureteral stent removal.  The patient was placed supine on the procedure table and the genitals were prepped and with chlorhexidine.  Using 16 [place] flexible cystoscope, cystourethroscopy was performed.    The urethra was patent without stricturing.    The visualized portions of the bladder mucosa was without trabeculation, tumors or concerning erythema.    There was a stent in the Left  ureter. It was grasped and withdrawn intact.      Next the semirigid ureteroscope was

## 2024-02-02 NOTE — DISCHARGE INSTRUCTIONS
URETEROSCOPY WITH LASER LITHOTRIPSY FOR THE TREATMENT OF KIDNEY STONES      Laser lithotripsy is a way to treat kidney stones. This treatment uses a laser to break kidney stones into tiny pieces.  There are no cuts or incisions made in your skin.  Your doctor performs the procedure by going through the urethra (tube that drains the urine from the bladder).      Once the stone is seen, a laser fiber is used to transmit energy that breaks up your kidney stone(s). The surgeon removes some pieces through the urethra with a small basket, and smaller pieces can be passed later with urination. The surgeon may also use a high-powered laser with high-frequency emissions that \"dust\" the stones into a fine powder. You can then pass the fine particles in your urine after surgery.           What You Need To Know About The Procedure:  Typically used for stones that have not been passed successfully and are lodged in the ureter (the tube that drains urine from the kidney to the bladder).  Uses the energy from a laser to break up the stone(s)  It is sometimes more successful than the ESWL procedure (shockwave therapy)  It creates minimal stone fragments to pass  Usually performed in an outpatient setting (no hospital stay)    What you may experience after the procedure:   Mild bladder irritation  Mild back or side (flank) pain with urination  Blood in urine  For several hours after the procedure you may have a burning feeling when you urinate. You may feel the urge to go even if you don't need to. This feeling should go away within a day    Ureteral stent:  Stents are temporary plastic tubes that are inserted into the ureter.  One end is in the kidney and the other in the bladder.  Their purpose is to keep the ureter open after treatment of a stone so that the kidney is able to drain urine.  Having the stent allows the edema (swelling) and inflammation present in the ureter to resolve after your treatment.  A stent is typically  later removed in the office at your follow up visit.  The stent is entirely internal and cannot be seen.    Stents are most commonly very well tolerated.  80% of patients will perhaps only report very mild bladder irritation, but you will likely see blood in the urine.  This is a normal finding with a stent.  You will want to be sure to hydrate well.    Anytime you have a stent in place, it is important that you urinate often.  It is not uncommon to feel like you need to go more frequently or have a fullness in your abdomen or flank (side).    Do not hold your urine. Do not allow your bladder to get full and uncomfortable.  Empty your bladder often and remember to stay well hydrated.    Medication:  You may discharged with a strong pain medication.  Use Tylenol first.  If that is not resolving your pain, then take your prescription medication.  Avoid NSAID's like ibuprofen as they can increase your risk for bleeding.  You may or may not have an antibiotic to take.  Your physician will let you know if this is necessary.  If you have stent discomfort or bladder spasm, you may receive a prescription for a bladder relaxant.  Your physician will discuss this with you.    Activity:  You may do your regular activities. But avoid hard exercise or sports for about a week or until there is no blood in your urine.  You can return to work within 24-48 hours.    Call your doctor now or seek immediate medical care if:  You have pain that does not get better after you take pain medicine.  You have new or more blood clots in your urine and feel that you are straining to void or empty your bladder (it is normal for the urine to be pink for a few days).  You cannot urinate.  A fever (temperature over 100.4F).  You are sick to your stomach and cannot keep fluids down for more than 12-16 hours.  Sudden pain in the calf, back of the knee, thigh, or groin with or without redness and swelling in your leg(s).

## 2024-02-03 LAB — SPECIMEN STATUS REPORT: NORMAL

## 2024-02-04 LAB — BACTERIA UR CULT: NO GROWTH

## 2024-02-05 ENCOUNTER — PROCEDURE VISIT (OUTPATIENT)
Age: 59
End: 2024-02-05
Payer: COMMERCIAL

## 2024-02-05 VITALS — SYSTOLIC BLOOD PRESSURE: 145 MMHG | HEART RATE: 73 BPM | DIASTOLIC BLOOD PRESSURE: 77 MMHG

## 2024-02-05 DIAGNOSIS — R10.9 RIGHT FLANK PAIN: ICD-10-CM

## 2024-02-05 DIAGNOSIS — N20.1 RIGHT URETERAL STONE: ICD-10-CM

## 2024-02-05 DIAGNOSIS — Z96.0 RETAINED URETERAL STENT: ICD-10-CM

## 2024-02-05 DIAGNOSIS — N20.0 CALCULUS OF KIDNEY: Primary | ICD-10-CM

## 2024-02-05 DIAGNOSIS — N23 RENAL COLIC ON LEFT SIDE: ICD-10-CM

## 2024-02-05 LAB
BILIRUBIN, URINE, POC: NEGATIVE
BLOOD URINE, POC: ABNORMAL
GLUCOSE URINE, POC: NEGATIVE
KETONES, URINE, POC: NEGATIVE
LEUKOCYTE ESTERASE, URINE, POC: ABNORMAL
NITRITE, URINE, POC: NEGATIVE
PH, URINE, POC: 7 (ref 4.6–8)
PROTEIN,URINE, POC: 300
SPECIFIC GRAVITY, URINE, POC: 1.02 (ref 1–1.03)
URINALYSIS CLARITY, POC: ABNORMAL
URINALYSIS COLOR, POC: ABNORMAL
UROBILINOGEN, POC: ABNORMAL

## 2024-02-05 PROCEDURE — 99214 OFFICE O/P EST MOD 30 MIN: CPT | Performed by: UROLOGY

## 2024-02-05 PROCEDURE — 96372 THER/PROPH/DIAG INJ SC/IM: CPT | Performed by: UROLOGY

## 2024-02-05 PROCEDURE — 52310 CYSTOSCOPY AND TREATMENT: CPT | Performed by: UROLOGY

## 2024-02-05 PROCEDURE — 81001 URINALYSIS AUTO W/SCOPE: CPT | Performed by: UROLOGY

## 2024-02-05 RX ORDER — KETOROLAC TROMETHAMINE 30 MG/ML
30 INJECTION, SOLUTION INTRAMUSCULAR; INTRAVENOUS ONCE
Status: COMPLETED | OUTPATIENT
Start: 2024-02-05 | End: 2024-02-05

## 2024-02-05 RX ORDER — KETOROLAC TROMETHAMINE 10 MG/1
10 TABLET, FILM COATED ORAL EVERY 6 HOURS PRN
Qty: 20 TABLET | Refills: 1 | Status: SHIPPED | OUTPATIENT
Start: 2024-02-05

## 2024-02-05 RX ORDER — CIPROFLOXACIN 500 MG/1
500 TABLET, FILM COATED ORAL ONCE
Status: COMPLETED | OUTPATIENT
Start: 2024-02-05 | End: 2024-02-05

## 2024-02-05 RX ADMIN — KETOROLAC TROMETHAMINE 30 MG: 30 INJECTION, SOLUTION INTRAMUSCULAR; INTRAVENOUS at 14:24

## 2024-02-05 RX ADMIN — CIPROFLOXACIN 500 MG: 500 TABLET, FILM COATED ORAL at 14:23

## 2024-02-05 NOTE — PROGRESS NOTES
CYSTOSCOPY/ STENT REMOVAL  Patient presented for cystoscopy and ureteral stent removal.  The patient was placed supine on the procedure table and the genitals were prepped and with chlorhexidine.  Using 16 [place] flexible cystoscope, cystourethroscopy was performed.    The urethra was patent without stricturing.    The visualized portions of the bladder mucosa was without trabeculation, tumors or concerning erythema.    There was a stent in the Right  ureter. It was grasped and withdrawn intact.       
Chief Complaint   Patient presents with    Procedure     CYSTO STENT REMOVAL        BP (!) 145/77   Pulse 73      PHQ-9 score is    Negative      1. \"Have you been to the ER, urgent care clinic since your last visit?  Hospitalized since your last visit?\" No    2. \"Have you seen or consulted any other health care providers outside of the [facility] since your last visit?\" No     3. For patients aged 45-75: Has the patient had a colonoscopy / FIT/ Cologuard? Yes - no Care Gap present      If the patient is female:    4. For patients aged 40-74: Has the patient had a mammogram within the past 2 years? NA - based on age or sex      5. For patients aged 21-65: Has the patient had a pap smear? NA - based on age or sex  
dose. Due to decreased absorption do not give by J tube.     -     ketorolac (TORADOL) injection 30 mg; 30 mg, IntraMUSCular, ONCE, 1 dose, On Mon 2/5/24 at 1015Do not administer for more than 5 days  -     ketorolac (TORADOL) 10 MG tablet; Take 1 tablet by mouth every 6 hours as needed for Pain, Disp-20 tablet, R-1Normal  4. Right ureteral stone  5. Retained ureteral stent        PAST MEDICAL HISTORY  PMHx (including negatives):  has a past medical history of Herpes zoster iritis of right eye, Kidney calculi, and Kidney stones.   PSurgHx:  has a past surgical history that includes Urological Surgery (04/28/2022); Urological Surgery (04/28/2022); Urological Surgery (04/28/2022); Urological Surgery (04/28/2022); Urological Surgery (05/02/2022); back surgery (N/A, 10/31/2023); Cystoscopy (Left, 01/29/2024); Cystocopy (02/05/2024); and Cystoscopy (Bilateral, 2/2/2024).  PSocHx:  reports that he has never smoked. He has never used smokeless tobacco. He reports that he does not currently use alcohol. He reports that he does not use drugs.   FamilyHX:   Family History   Problem Relation Age of Onset    No Known Problems Mother     No Known Problems Father        ROS  Review of Systems  Significant pain in the right side as well as pelvic pain    PHYSICAL EXAM  Physical Exam  GENERAL: awake, alert, cooperative, in acute distress  HEENT:  * Pupils equal, EOMI  * Head atraumatic  CV:  * audible heart sounds  * warm and perfused extremities bilaterally  PULMONARY: Good air movement, no wheezes, no crackles  ABDOMEN/: soft, no distension, no guarding, no abdominal tenderness, azevedo negative, McBurney negative, Rovsig negative, no masses, no CVA tenderness.  EXTREMITIES/BACK: warm and perfused, no tenderness, no edema, no signs of DVT (warmth, asymmetric swelling, erythema, or calf tenderness).  SKIN: no rashes or signs of trauma  NEURO:  * Speech clear  * Moves U&LE to command  ASSESSMENT and PLAN  1. Calculus of kidney  -

## 2024-02-06 LAB
APPEARANCE UR: ABNORMAL
BACTERIA #/AREA URNS HPF: ABNORMAL /[HPF]
BILIRUB UR QL STRIP: NEGATIVE
CASTS URNS QL MICRO: ABNORMAL /LPF
COLOR UR: ABNORMAL
EPI CELLS #/AREA URNS HPF: ABNORMAL /HPF (ref 0–10)
GLUCOSE UR QL STRIP: NEGATIVE
HGB UR QL STRIP: ABNORMAL
KETONES UR QL STRIP: NEGATIVE
LEUKOCYTE ESTERASE UR QL STRIP: ABNORMAL
MICRO URNS: ABNORMAL
NITRITE UR QL STRIP: NEGATIVE
PH UR STRIP: 6.5 [PH] (ref 5–7.5)
PROT UR QL STRIP: ABNORMAL
RBC #/AREA URNS HPF: >30 /HPF (ref 0–2)
SP GR UR STRIP: 1.02 (ref 1–1.03)
UROBILINOGEN UR STRIP-MCNC: 0.2 MG/DL (ref 0.2–1)
WBC #/AREA URNS HPF: >30 /HPF (ref 0–5)

## 2024-02-07 LAB
2,8 DIHYDROXYADENINE: NORMAL
AMM URATE MFR STONE: NORMAL %
BACTERIA UR CULT: NO GROWTH
BILIRUBIN, STONE: NORMAL
BLD.DRIED MFR STONE: NORMAL %
CA BILIRUB MFR STONE: NORMAL %
CA CARBONATE MFR STONE: NORMAL %
CA H2 PHOS DIHYD MFR STONE: NORMAL %
CA PHOS MFR STONE: NORMAL %
CALCIUM OXALATE DIHYDRATE MFR STONE IR: NORMAL %
CALCIUM PALMITATE: NORMAL
CALCIUM STEARATE: NORMAL
CARBONATE APATITE: NORMAL %
CELL MATERIAL STONE EST-MCNT: NORMAL %
CHOLEST MFR STONE: NORMAL %
COLOR STONE: NORMAL
COM MFR STONE: 100 %
CYSTINE MFR STONE: NORMAL %
DRUG OR METABOLITE: NORMAL
HYDROXYAPATITE: NORMAL %
LABORATORY COMMENT REPORT: NORMAL
LABORATORY COMMENT REPORT: NORMAL
Lab: NORMAL
Lab: NORMAL
NA URATE MFR STONE IR: NORMAL %
NEWBERYITE MFR STONE: NORMAL %
NIDUS STONE QL: NORMAL
OTHER COMPONENT(S): NORMAL
PHOTO: NORMAL
SHELL: NORMAL
SIZE STONE: NORMAL MM
SPECIMEN SOURCE: NORMAL
STONE ANALYSIS-IMP: NORMAL
STONE COMPOSITION: NORMAL
SURFACE CRYSTALS: NORMAL
TRI-PHOS MFR STONE: NORMAL %
TRIAMTERENE MFR STONE: NORMAL %
URATE DIHYD MFR STONE: NORMAL %
URATE MFR STONE: NORMAL %
WT STONE: 28 MG
XANTHINE, STONE: NORMAL

## 2024-02-13 LAB
2,8 DIHYDROXYADENINE: NORMAL
AMM URATE MFR STONE: NORMAL %
BILIRUBIN, STONE: NORMAL
BLD.DRIED MFR STONE: NORMAL %
CA BILIRUB MFR STONE: NORMAL %
CA CARBONATE MFR STONE: NORMAL %
CA H2 PHOS DIHYD MFR STONE: NORMAL %
CA PHOS MFR STONE: NORMAL %
CALCIUM OXALATE DIHYDRATE MFR STONE IR: NORMAL %
CALCIUM PALMITATE: NORMAL
CALCIUM STEARATE: NORMAL
CARBONATE APATITE: NORMAL %
CELL MATERIAL STONE EST-MCNT: NORMAL %
CHOLEST MFR STONE: NORMAL %
COLOR STONE: NORMAL
COM MFR STONE: 100 %
CYSTINE MFR STONE: NORMAL %
DRUG OR METABOLITE: NORMAL
HYDROXYAPATITE: NORMAL %
LABORATORY COMMENT REPORT: NORMAL
LABORATORY COMMENT REPORT: NORMAL
Lab: NORMAL
Lab: NORMAL
NA URATE MFR STONE IR: NORMAL %
NEWBERYITE MFR STONE: NORMAL %
NIDUS STONE QL: NORMAL
OTHER COMPONENT(S): NORMAL
PHOTO: NORMAL
SHELL: NORMAL
SIZE STONE: NORMAL MM
SPECIMEN SOURCE: NORMAL
STONE ANALYSIS-IMP: NORMAL
STONE COMPOSITION: NORMAL
SURFACE CRYSTALS: NORMAL
TRI-PHOS MFR STONE: NORMAL %
TRIAMTERENE MFR STONE: NORMAL %
URATE DIHYD MFR STONE: NORMAL %
URATE MFR STONE: NORMAL %
WT STONE: 49 MG
XANTHINE, STONE: NORMAL

## 2024-03-03 DIAGNOSIS — N20.0 KIDNEY STONES: Primary | ICD-10-CM

## (undated) DEVICE — GLOVE SURG SZ 65 L12IN FNGR THK79MIL GRN LTX FREE

## (undated) DEVICE — URETERAL ACCESS SHEATH SET: Brand: NAVIGATOR HD

## (undated) DEVICE — SOLUTION SCRB 4OZ 4% CHG H2O AIDED FOR PREOPERATIVE SKIN

## (undated) DEVICE — SOLUTION IV 500ML 0.9% SOD CHL FLX CONT

## (undated) DEVICE — GLOVE SURG SZ 7 L12IN FNGR THK79MIL GRN LTX FREE

## (undated) DEVICE — RADIFOCUS GLIDEWIRE: Brand: GLIDEWIRE

## (undated) DEVICE — ELECTRODE PT RET AD L9FT HI MOIST COND ADH HYDRGEL CORDED

## (undated) DEVICE — MINOR GENERAL PACK: Brand: MEDLINE INDUSTRIES, INC.

## (undated) DEVICE — PREP PAD BNS: Brand: CONVERTORS

## (undated) DEVICE — Device

## (undated) DEVICE — SPONGE GZ W4XL4IN COT 12 PLY TYP VII WVN C FLD DSGN

## (undated) DEVICE — 3M™ SURGICAL CLIPPER WITH PIVOTING HEAD, 9660, 50/CASE: Brand: 3M™

## (undated) DEVICE — SOLUTION IRRIG 500ML 0.9% SOD CHLO USP POUR PLAS BTL

## (undated) DEVICE — DRAPE EQUIP C ARM 74X42 IN MOB XR W/ TIE RUBBER BND LF

## (undated) DEVICE — SHEATH ACCESS 12 14FR 45CM URETERAL LF

## (undated) DEVICE — CATHETER ETER URET 5FR L70CM 0038IN FLX OPN TIP NO SIDE EYE

## (undated) DEVICE — SEAL INSTR PRT SELF SEAL

## (undated) DEVICE — CYSTO PACK: Brand: MEDLINE INDUSTRIES, INC.

## (undated) DEVICE — GARMENT,MEDLINE,DVT,INT,CALF,MED, GEN2: Brand: MEDLINE

## (undated) DEVICE — NCIRCLE, NITINOL TIPLESS STONE EXTRACTOR: Brand: NCIRCLE

## (undated) DEVICE — 200 MICRON SINGLE-USE LASER FIBER W/FLAT TIP

## (undated) DEVICE — URETEROSCOPE FLX DIGITAL 3.6 FRX670 MM 8.6 FR STD WISCOPE

## (undated) DEVICE — TUBING, SUCTION, 1/4" X 12', STRAIGHT: Brand: MEDLINE

## (undated) DEVICE — SOUTHSIDE TURNOVER: Brand: MEDLINE INDUSTRIES, INC.

## (undated) DEVICE — SOLUTION IRRIG 3000ML 0.9% SOD CHL USP UROMATIC PLAS CONT

## (undated) DEVICE — CONTAINER,SPECIMEN,3OZ,OR STRL: Brand: MEDLINE

## (undated) DEVICE — GLOVE SURG SZ 65 THK91MIL LTX FREE SYN POLYISOPRENE

## (undated) DEVICE — BLADE,CARBON-STEEL,15,STRL,DISPOSABLE,TB: Brand: MEDLINE

## (undated) DEVICE — VINYL ACETATE UROLOGICAL DRAINAGE BAG FOR GE/OEC SYSTEMS W/ 8MM PLUG - NON-STERILE: Brand: CUSTOM MEDICAL SPECIALTIES, INC.

## (undated) DEVICE — GOWN,PREVENTION PLUS,XLN/XL,ST,24/CS: Brand: MEDLINE

## (undated) DEVICE — BAG,DRAINAGE,ANTI-REFLUX TOWER,2000ML: Brand: MEDLINE

## (undated) DEVICE — GAUZE,PACKING STRIP,IODOFORM,1/2"X5YD,ST: Brand: CURAD

## (undated) DEVICE — GAUZE,SPONGE,4"X4",16PLY,STRL,LF,10/TRAY: Brand: MEDLINE

## (undated) DEVICE — SYRINGE IRRIG 60ML SFT PLIABLE BLB EZ TO GRP 1 HND USE W/

## (undated) DEVICE — NCIRCLE, NITINOL TIPLESS STONE EXTRACTOR MODIFIED BASKET: Brand: NCIRCLE